# Patient Record
Sex: FEMALE | Race: WHITE | NOT HISPANIC OR LATINO | Employment: FULL TIME | URBAN - METROPOLITAN AREA
[De-identification: names, ages, dates, MRNs, and addresses within clinical notes are randomized per-mention and may not be internally consistent; named-entity substitution may affect disease eponyms.]

---

## 2023-04-10 LAB
AMORPHOUS CRYSTALS, URINE: ABNORMAL /HPF
MUCUS, URINE: ABNORMAL /LPF
RBC, URINE: ABNORMAL /HPF (ref 0–5)
SQUAMOUS EPITHELIAL CELLS, URINE: 6 /HPF
WBC, URINE: 11 /HPF (ref 0–5)

## 2023-04-11 LAB — URINE CULTURE: NORMAL

## 2023-05-01 LAB
ALANINE AMINOTRANSFERASE (SGPT) (U/L) IN SER/PLAS: 15 U/L (ref 7–45)
ALBUMIN (G/DL) IN SER/PLAS: 4.5 G/DL (ref 3.4–5)
ALKALINE PHOSPHATASE (U/L) IN SER/PLAS: 48 U/L (ref 33–110)
ANION GAP IN SER/PLAS: 13 MMOL/L (ref 10–20)
ASPARTATE AMINOTRANSFERASE (SGOT) (U/L) IN SER/PLAS: 18 U/L (ref 9–39)
BASOPHILS (10*3/UL) IN BLOOD BY AUTOMATED COUNT: 0.08 X10E9/L (ref 0–0.1)
BASOPHILS/100 LEUKOCYTES IN BLOOD BY AUTOMATED COUNT: 0.9 % (ref 0–2)
BILIRUBIN TOTAL (MG/DL) IN SER/PLAS: 0.4 MG/DL (ref 0–1.2)
C REACTIVE PROTEIN (MG/L) IN SER/PLAS: 1.19 MG/DL
CALCIDIOL (25 OH VITAMIN D3) (NG/ML) IN SER/PLAS: 70 NG/ML
CALCIUM (MG/DL) IN SER/PLAS: 9.5 MG/DL (ref 8.6–10.3)
CARBON DIOXIDE, TOTAL (MMOL/L) IN SER/PLAS: 25 MMOL/L (ref 21–32)
CHLORIDE (MMOL/L) IN SER/PLAS: 104 MMOL/L (ref 98–107)
CREATININE (MG/DL) IN SER/PLAS: 0.77 MG/DL (ref 0.5–1.05)
EOSINOPHILS (10*3/UL) IN BLOOD BY AUTOMATED COUNT: 0.14 X10E9/L (ref 0–0.7)
EOSINOPHILS/100 LEUKOCYTES IN BLOOD BY AUTOMATED COUNT: 1.6 % (ref 0–6)
ERYTHROCYTE DISTRIBUTION WIDTH (RATIO) BY AUTOMATED COUNT: 12.8 % (ref 11.5–14.5)
ERYTHROCYTE MEAN CORPUSCULAR HEMOGLOBIN CONCENTRATION (G/DL) BY AUTOMATED: 33.6 G/DL (ref 32–36)
ERYTHROCYTE MEAN CORPUSCULAR VOLUME (FL) BY AUTOMATED COUNT: 87 FL (ref 80–100)
ERYTHROCYTES (10*6/UL) IN BLOOD BY AUTOMATED COUNT: 5.06 X10E12/L (ref 4–5.2)
GFR FEMALE: >90 ML/MIN/1.73M2
GLUCOSE (MG/DL) IN SER/PLAS: 84 MG/DL (ref 74–99)
HEMATOCRIT (%) IN BLOOD BY AUTOMATED COUNT: 44 % (ref 36–46)
HEMOGLOBIN (G/DL) IN BLOOD: 14.8 G/DL (ref 12–16)
IMMATURE GRANULOCYTES/100 LEUKOCYTES IN BLOOD BY AUTOMATED COUNT: 0.3 % (ref 0–0.9)
LEUKOCYTES (10*3/UL) IN BLOOD BY AUTOMATED COUNT: 8.9 X10E9/L (ref 4.4–11.3)
LYMPHOCYTES (10*3/UL) IN BLOOD BY AUTOMATED COUNT: 3.69 X10E9/L (ref 1.2–4.8)
LYMPHOCYTES/100 LEUKOCYTES IN BLOOD BY AUTOMATED COUNT: 41.5 % (ref 13–44)
MONOCYTES (10*3/UL) IN BLOOD BY AUTOMATED COUNT: 0.59 X10E9/L (ref 0.1–1)
MONOCYTES/100 LEUKOCYTES IN BLOOD BY AUTOMATED COUNT: 6.6 % (ref 2–10)
NEUTROPHILS (10*3/UL) IN BLOOD BY AUTOMATED COUNT: 4.36 X10E9/L (ref 1.2–7.7)
NEUTROPHILS/100 LEUKOCYTES IN BLOOD BY AUTOMATED COUNT: 49.1 % (ref 40–80)
PLATELETS (10*3/UL) IN BLOOD AUTOMATED COUNT: 335 X10E9/L (ref 150–450)
POTASSIUM (MMOL/L) IN SER/PLAS: 3.6 MMOL/L (ref 3.5–5.3)
PROTEIN TOTAL: 7.3 G/DL (ref 6.4–8.2)
SEDIMENTATION RATE, ERYTHROCYTE: 8 MM/H (ref 0–20)
SODIUM (MMOL/L) IN SER/PLAS: 138 MMOL/L (ref 136–145)
UREA NITROGEN (MG/DL) IN SER/PLAS: 9 MG/DL (ref 6–23)

## 2023-05-02 LAB
IMMUNOCAP IGE: 2.6 KU/L (ref 0–214)
THYROPEROXIDASE AB (IU/ML) IN SER/PLAS: <28 IU/ML
TISSUE TRANSGLUTAMINASE IGG: <1 U/ML (ref 0–14)

## 2023-05-03 LAB
ANTI-NUCLEAR ANTIBODY (ANA): NEGATIVE
ENDOMYSIAL ANTIBODY IGA TITER: NORMAL

## 2023-05-04 LAB — THYROGLOBULIN AB (IU/ML) IN SER/PLAS: <0.9 IU/ML (ref 0–4)

## 2023-05-08 LAB — TRYPTASE: 5 MCG/L

## 2023-05-09 LAB — URTICARIA-INDUCING ACTIVITY: 3.7

## 2023-08-24 PROBLEM — N92.0 HEAVY MENSES: Status: ACTIVE | Noted: 2023-08-24

## 2023-08-24 PROBLEM — Z97.5 INTERMENSTRUAL SPOTTING DUE TO IUD: Status: ACTIVE | Noted: 2023-08-24

## 2023-08-24 PROBLEM — E28.2 PCOS (POLYCYSTIC OVARIAN SYNDROME): Status: ACTIVE | Noted: 2023-08-24

## 2023-08-24 PROBLEM — L23.9 ALLERGIC CONTACT DERMATITIS: Status: ACTIVE | Noted: 2023-08-24

## 2023-08-24 PROBLEM — F41.8 DEPRESSION WITH ANXIETY: Status: ACTIVE | Noted: 2023-08-24

## 2023-08-24 PROBLEM — N92.0 INTERMENSTRUAL SPOTTING DUE TO IUD: Status: ACTIVE | Noted: 2023-08-24

## 2023-08-24 PROBLEM — I26.99 PULMONARY EMBOLISM (MULTI): Status: ACTIVE | Noted: 2023-08-24

## 2023-08-24 PROBLEM — M25.50 ARTHRALGIA: Status: ACTIVE | Noted: 2023-08-24

## 2023-08-24 PROBLEM — G25.81 RLS (RESTLESS LEGS SYNDROME): Status: ACTIVE | Noted: 2023-08-24

## 2023-08-24 PROBLEM — R41.89 COGNITIVE CHANGES: Status: ACTIVE | Noted: 2023-08-24

## 2023-08-24 PROBLEM — M13.0 POLYARTHRITIS: Status: ACTIVE | Noted: 2023-08-24

## 2023-08-24 PROBLEM — G44.89: Status: ACTIVE | Noted: 2023-08-24

## 2023-08-24 PROBLEM — Z97.5 IUD (INTRAUTERINE DEVICE) IN PLACE: Status: ACTIVE | Noted: 2023-08-24

## 2023-08-24 PROBLEM — I82.409 DVT (DEEP VENOUS THROMBOSIS) (MULTI): Status: ACTIVE | Noted: 2023-08-24

## 2023-08-24 PROBLEM — R06.02 SHORTNESS OF BREATH AT REST: Status: ACTIVE | Noted: 2023-08-24

## 2023-08-24 PROBLEM — F31.62 BIPOLAR 1 DISORDER, MIXED, MODERATE (MULTI): Status: ACTIVE | Noted: 2023-08-24

## 2023-08-24 PROBLEM — R53.83 FATIGUE: Status: ACTIVE | Noted: 2023-08-24

## 2023-08-24 PROBLEM — F41.9 ANXIETY: Status: ACTIVE | Noted: 2023-08-24

## 2023-08-24 PROBLEM — G43.109 MIGRAINE WITH VISUAL AURA: Status: ACTIVE | Noted: 2023-08-24

## 2023-08-24 RX ORDER — PHENTERMINE HYDROCHLORIDE 37.5 MG/1
0.5 TABLET ORAL 2 TIMES DAILY
COMMUNITY
Start: 2022-08-29 | End: 2023-10-04 | Stop reason: ALTCHOICE

## 2023-08-24 RX ORDER — GABAPENTIN 300 MG/1
2 CAPSULE ORAL 3 TIMES DAILY
COMMUNITY
End: 2023-10-17 | Stop reason: WASHOUT

## 2023-08-24 RX ORDER — QUETIAPINE FUMARATE 50 MG/1
1 TABLET, FILM COATED ORAL NIGHTLY
COMMUNITY
Start: 2022-11-01 | End: 2023-11-27 | Stop reason: SDUPTHER

## 2023-08-24 RX ORDER — LURASIDONE HYDROCHLORIDE 40 MG/1
40 TABLET, FILM COATED ORAL
COMMUNITY
End: 2023-11-27 | Stop reason: DRUGHIGH

## 2023-08-24 RX ORDER — ZIPRASIDONE HYDROCHLORIDE 20 MG/1
1 CAPSULE ORAL
COMMUNITY
Start: 2022-11-03 | End: 2023-10-17 | Stop reason: WASHOUT

## 2023-08-24 RX ORDER — CODEINE PHOSPHATE AND GUAIFENESIN 10; 100 MG/5ML; MG/5ML
5 SOLUTION ORAL EVERY 4 HOURS PRN
COMMUNITY
Start: 2022-05-15 | End: 2023-10-17 | Stop reason: WASHOUT

## 2023-08-24 RX ORDER — SUMATRIPTAN 6 MG/.5ML
6 INJECTION, SOLUTION SUBCUTANEOUS
COMMUNITY
End: 2023-10-17 | Stop reason: WASHOUT

## 2023-08-24 RX ORDER — ONDANSETRON 4 MG/1
1 TABLET, FILM COATED ORAL EVERY 8 HOURS PRN
COMMUNITY
Start: 2022-05-04

## 2023-08-24 RX ORDER — CETIRIZINE HYDROCHLORIDE 10 MG/1
1 TABLET ORAL DAILY
COMMUNITY
End: 2023-12-04 | Stop reason: ALTCHOICE

## 2023-08-24 RX ORDER — QUETIAPINE FUMARATE 25 MG/1
1 TABLET, FILM COATED ORAL NIGHTLY
COMMUNITY
Start: 2022-11-01 | End: 2023-11-27 | Stop reason: DRUGHIGH

## 2023-08-24 RX ORDER — ALPRAZOLAM 0.25 MG/1
1 TABLET ORAL NIGHTLY PRN
COMMUNITY

## 2023-08-24 RX ORDER — LEVONORGESTREL 52 MG/1
INTRAUTERINE DEVICE INTRAUTERINE
COMMUNITY

## 2023-10-03 ENCOUNTER — CLINICAL SUPPORT (OUTPATIENT)
Dept: ALLERGY | Facility: CLINIC | Age: 43
End: 2023-10-03
Payer: COMMERCIAL

## 2023-10-03 ENCOUNTER — SPECIALTY PHARMACY (OUTPATIENT)
Dept: PHARMACY | Facility: CLINIC | Age: 43
End: 2023-10-03

## 2023-10-03 ENCOUNTER — E-VISIT (OUTPATIENT)
Dept: PRIMARY CARE | Facility: CLINIC | Age: 43
End: 2023-10-03
Payer: COMMERCIAL

## 2023-10-03 VITALS
HEART RATE: 81 BPM | SYSTOLIC BLOOD PRESSURE: 108 MMHG | WEIGHT: 193.8 LBS | DIASTOLIC BLOOD PRESSURE: 78 MMHG | BODY MASS INDEX: 35.66 KG/M2 | OXYGEN SATURATION: 97 % | HEIGHT: 62 IN

## 2023-10-03 DIAGNOSIS — Z13.1 SCREENING FOR DIABETES MELLITUS (DM): ICD-10-CM

## 2023-10-03 DIAGNOSIS — E66.9 OBESITY (BMI 30-39.9): ICD-10-CM

## 2023-10-03 DIAGNOSIS — R53.83 OTHER FATIGUE: ICD-10-CM

## 2023-10-03 DIAGNOSIS — Z12.31 ENCOUNTER FOR SCREENING MAMMOGRAM FOR MALIGNANT NEOPLASM OF BREAST: ICD-10-CM

## 2023-10-03 DIAGNOSIS — Z00.00 HEALTHCARE MAINTENANCE: Primary | ICD-10-CM

## 2023-10-03 DIAGNOSIS — L23.9 ALLERGIC CONTACT DERMATITIS, UNSPECIFIED TRIGGER: ICD-10-CM

## 2023-10-03 DIAGNOSIS — Z13.220 SCREENING, LIPID: ICD-10-CM

## 2023-10-03 PROCEDURE — 99396 PREV VISIT EST AGE 40-64: CPT | Performed by: STUDENT IN AN ORGANIZED HEALTH CARE EDUCATION/TRAINING PROGRAM

## 2023-10-03 PROCEDURE — 1036F TOBACCO NON-USER: CPT | Performed by: STUDENT IN AN ORGANIZED HEALTH CARE EDUCATION/TRAINING PROGRAM

## 2023-10-03 PROCEDURE — 3008F BODY MASS INDEX DOCD: CPT | Performed by: STUDENT IN AN ORGANIZED HEALTH CARE EDUCATION/TRAINING PROGRAM

## 2023-10-03 RX ORDER — BETAMETHASONE DIPROPIONATE 0.5 MG/G
1 CREAM TOPICAL DAILY
COMMUNITY
Start: 2023-04-08

## 2023-10-03 RX ORDER — BUPROPION HYDROCHLORIDE 100 MG/1
1 TABLET, EXTENDED RELEASE ORAL 2 TIMES DAILY
COMMUNITY
Start: 2023-09-26 | End: 2023-11-27 | Stop reason: SDUPTHER

## 2023-10-03 ASSESSMENT — PATIENT HEALTH QUESTIONNAIRE - PHQ9
2. FEELING DOWN, DEPRESSED OR HOPELESS: NOT AT ALL
SUM OF ALL RESPONSES TO PHQ9 QUESTIONS 1 AND 2: 0
1. LITTLE INTEREST OR PLEASURE IN DOING THINGS: NOT AT ALL

## 2023-10-03 ASSESSMENT — ENCOUNTER SYMPTOMS
FREQUENCY: 0
SHORTNESS OF BREATH: 0
EYE DISCHARGE: 0
MYALGIAS: 0
HEMATURIA: 0
WHEEZING: 0
FATIGUE: 0
APPETITE CHANGE: 0
DIARRHEA: 0
DYSURIA: 0
HALLUCINATIONS: 0
NAUSEA: 0
VOMITING: 0
HEADACHES: 0
ABDOMINAL PAIN: 0
POLYDIPSIA: 0
CONSTIPATION: 0
PALPITATIONS: 0
EYE PAIN: 0
COUGH: 0
SORE THROAT: 0
FEVER: 0

## 2023-10-03 ASSESSMENT — LIFESTYLE VARIABLES
SKIP TO QUESTIONS 9-10: 1
AUDIT-C TOTAL SCORE: 0
HOW OFTEN DO YOU HAVE A DRINK CONTAINING ALCOHOL: NEVER
HOW OFTEN DO YOU HAVE SIX OR MORE DRINKS ON ONE OCCASION: NEVER
HOW MANY STANDARD DRINKS CONTAINING ALCOHOL DO YOU HAVE ON A TYPICAL DAY: PATIENT DOES NOT DRINK

## 2023-10-03 ASSESSMENT — PAIN SCALES - GENERAL: PAINLEVEL: 0-NO PAIN

## 2023-10-03 ASSESSMENT — VISUAL ACUITY
OD_CC: 20/15
OS_CC: 20/20

## 2023-10-03 NOTE — PROGRESS NOTES
Subjective   Sivan Underwood is a 43 y.o. female and is here for a comprehensive physical exam. The patient reports no problems.    Do you take any herbs or supplements that were not prescribed by a doctor? yes, Vitamins  Are you taking calcium supplements? no  Are you taking aspirin daily? no      History:  LMP: IUD irregular every 3 months roughly  Last pap date: Last July  Abnormal pap? no  : 2  Para: 2    Do you have pain that bothers you in your daily life? no    Cancer:   -Colon (Age > 50): Denies FH  -PAP (Age > 20): Have OBGYN  -Breast (Age > 40): Due  -Lung (Age 55-80, 30 pack year, cessation within 15 years): Former smoker; Stopped     Vaccination  Tetnus: Up to date; Next   Flu: Up to date    Osteoporosis screening (Age > 59 or Fragility fracture): N/A    Tobacco: As stated above  Alcohol: Holidays  Diabetes: Denies  Lipids: Denies  Cognitive: Denies any Cognitive deficits. No cognitive deficits noted.    Review of Systems   Constitutional:  Negative for appetite change, fatigue and fever.   HENT:  Negative for congestion, ear discharge, ear pain, hearing loss and sore throat.    Eyes:  Negative for pain and discharge.   Respiratory:  Negative for cough, shortness of breath and wheezing.    Cardiovascular:  Negative for chest pain, palpitations and leg swelling.   Gastrointestinal:  Negative for abdominal pain, constipation, diarrhea, nausea and vomiting.   Endocrine: Negative for cold intolerance, heat intolerance and polydipsia.   Genitourinary:  Negative for dysuria, frequency and hematuria.   Musculoskeletal:  Negative for gait problem and myalgias.   Skin:  Negative for rash.   Neurological:  Negative for syncope and headaches.   Psychiatric/Behavioral:  Negative for hallucinations and suicidal ideas.         Objective   Physical Exam  Constitutional:       Appearance: Normal appearance.   HENT:      Head: Normocephalic and atraumatic.      Right Ear: External ear normal.      Left  Ear: External ear normal.      Nose: Nose normal.      Mouth/Throat:      Mouth: Mucous membranes are moist.   Eyes:      Extraocular Movements: Extraocular movements intact.      Conjunctiva/sclera: Conjunctivae normal.      Pupils: Pupils are equal, round, and reactive to light.   Cardiovascular:      Rate and Rhythm: Normal rate and regular rhythm.      Pulses: Normal pulses.      Heart sounds: Normal heart sounds.   Pulmonary:      Effort: Pulmonary effort is normal.      Breath sounds: Normal breath sounds.   Abdominal:      General: Abdomen is flat.      Palpations: Abdomen is soft.   Neurological:      General: No focal deficit present.      Mental Status: She is alert and oriented to person, place, and time.   Psychiatric:         Mood and Affect: Mood normal.         Behavior: Behavior normal.       Assessment/Plan       1. Preventive screenings as stated above  2. Patient Counseling:  --Nutrition: Stressed importance of moderation in sodium/caffeine intake, saturated fat and cholesterol, caloric balance, sufficient intake of fresh fruits, vegetables, fiber, calcium, iron, and 1 mg of folate supplement per day (for females capable of pregnancy).  --Discussed the issue of estrogen replacement, calcium supplement, and the daily use of baby aspirin.  --Exercise: Stressed the importance of regular exercise.   --Substance Abuse: Discussed cessation/primary prevention of tobacco, alcohol, or other drug use; driving or other dangerous activities under the influence; availability of treatment for abuse.    --Sexuality: Discussed sexually transmitted diseases, partner selection, use of condoms, avoidance of unintended pregnancy  and contraceptive alternatives.   --Injury prevention: Discussed safety belts, safety helmets, smoke detector, smoking near bedding or upholstery.   --Dental health: Discussed importance of regular tooth brushing, flossing, and dental visits.  --Immunizations reviewed.  --Discussed benefits  of screening colonoscopy.  --After hours service discussed with patient  3. Discussed the patient's BMI with her.  The BMI is above average. The patient received Provided instructions on dietary changes because they have an above normal BMI.  4. Follow up in one year

## 2023-10-04 RX ORDER — PHENTERMINE HYDROCHLORIDE 37.5 MG/1
TABLET ORAL
Qty: 30 TABLET | Refills: 0 | Status: SHIPPED | OUTPATIENT
Start: 2023-10-04 | End: 2023-11-07 | Stop reason: SDUPTHER

## 2023-10-17 ENCOUNTER — OFFICE VISIT (OUTPATIENT)
Dept: SURGERY | Facility: CLINIC | Age: 43
End: 2023-10-17
Payer: COMMERCIAL

## 2023-10-17 VITALS
DIASTOLIC BLOOD PRESSURE: 89 MMHG | BODY MASS INDEX: 34.69 KG/M2 | HEART RATE: 84 BPM | WEIGHT: 188.5 LBS | TEMPERATURE: 98.2 F | HEIGHT: 62 IN | SYSTOLIC BLOOD PRESSURE: 132 MMHG

## 2023-10-17 DIAGNOSIS — E66.09 CLASS 2 OBESITY DUE TO EXCESS CALORIES WITH BODY MASS INDEX (BMI) OF 35.0 TO 35.9 IN ADULT, UNSPECIFIED WHETHER SERIOUS COMORBIDITY PRESENT: Primary | ICD-10-CM

## 2023-10-17 DIAGNOSIS — R63.2 POLYPHAGIA: ICD-10-CM

## 2023-10-17 PROCEDURE — 99213 OFFICE O/P EST LOW 20 MIN: CPT

## 2023-10-17 PROCEDURE — 3008F BODY MASS INDEX DOCD: CPT

## 2023-10-17 PROCEDURE — 1036F TOBACCO NON-USER: CPT

## 2023-10-17 NOTE — PROGRESS NOTES
Subjective     Patient ID: Sivan Underwood is a 43 y.o. female who presents for FUV on CWL.    HPI    This is 42 YO Male who presents here for a weight management consultation.   Referred by Sarai Cunningham MD        # Weight History:  Initial onset of obesity: around 2018  Past Diet Attempts: strict vegan  Diet with best results/success: meal planning and calories counting  Have/Have not taken Prescription/OTC medications for weight loss including: Adipex  Goal(s) for weight loss: 130-140 lbs  Juice, pop or other high calorie beverages? 1 can of pop every other day   Restaurant/Fast Food Frequency: 2-3 times a week     # Anthropometric Measurements:       Highest Adult Weight: 210 lbs, about 1 year ago      Lowest Adult Weight: 105 lbs in 2018  Previous Weight: 198 lbs  Current Weight: 188 lbs  Initial BMI 36  Total Weight Loss: 10 lbs     # Current Exercise Routine: none         PMH:   Active Problems: Anxiety and Depression, Bipolar 1, DVT, PCOS       Past Medical History:   Diagnosis Date    Acute bronchitis, unspecified 01/20/2022    Bronchitis, subacute    Chronic cough 01/20/2022    Post-COVID chronic cough    Injury of conjunctiva and corneal abrasion without foreign body, right eye, initial encounter 03/30/2017    Abrasion of cornea, right    Obesity, unspecified 02/19/2022    Class 2 obesity with body mass index (BMI) of 37.0 to 37.9 in adult    Obesity, unspecified 03/06/2022    Class 2 obesity with body mass index (BMI) of 36.0 to 36.9 in adult    Obesity, unspecified     Class 1 obesity with body mass index (BMI) of 31.0 to 31.9 in adult    Other chest pain 05/12/2022    Chest heaviness    Otitis media, unspecified, left ear 06/16/2018    Left otitis media    Pain in right lower leg 05/12/2022    Right calf pain    Personal history of other diseases of the respiratory system 06/16/2018    History of pharyngitis    Personal history of other diseases of the respiratory system 06/16/2018    History of sore  "throat      Past Surgical History:   Procedure Laterality Date     SECTION, CLASSIC  2017     Section    CT HEAD ANGIO W AND WO IV CONTRAST  2022    CT HEAD ANGIO W AND WO IV CONTRAST 2022 [facility]    CT NECK ANGIO W AND WO IV CONTRAST  2022    CT NECK ANGIO W AND WO IV CONTRAST 2022 [facility]    HERNIA REPAIR  2017    Hernia Repair    OTHER SURGICAL HISTORY  2022    Breast augmentation      Family History   Problem Relation Name Age of Onset    Diabetes Mother      Rheum arthritis Mother      Hypertension Mother      Bipolar disorder Father      Diabetes Other FAMILY HISTORY       Social History     Tobacco Use   Smoking Status Former    Types: Cigarettes    Quit date:     Years since quittin.8   Smokeless Tobacco Never      Social History     Substance and Sexual Activity   Drug Use Never      Social History     Substance and Sexual Activity   Alcohol Use Never        Allergies  Allergies   Allergen Reactions    Doxycycline Unknown     HIVES      Formaldehyde Unknown    Lamictal [Lamotrigine] Unknown    Meperidine Unknown    Morphine Unknown    Tioconazole Unknown        VITALS  Visit Vitals  /89   Pulse 84   Temp 36.8 °C (98.2 °F)   Ht 1.562 m (5' 1.5\")   Wt 85.5 kg (188 lb 8 oz)   BMI 35.04 kg/m²   Smoking Status Former   BSA 1.93 m²        LABS  Lab Results   Component Value Date/Time    [id number] 218 2022 1100    TSH 1.48 2022 1103    VITD25 70 2023 1357       ROS  Review of Systems  GENERAL: Denies fever/chills       HEENT: Denies headache, new or worsening vision and hearing problems, nasal congestion, hoarseness, sore throat             CV: Denies chest pain, palpitations         RESP: Denies SOB, cough, wheezing              GI: Denies n/v, abdominal pain, diarrhea, constipation            : Denies urinary urgency/frequency     NEURO: Denies headache, weakness, numbness, tingling       ENDO: " Denies excessive heat/cold, recent weight gain/loss        MUSC:Denies low back pain, joint pain      PSYCH: Denies substance use disorder, anxiety, depression       PHYSICAL EXAM  Physical Exam  General- No acute distress, well appearing and well nourished. Obese  HEENT - no erythema, swelling or discharge. Neck supple, no cervical lymphadenopathy.   Pulmonary - respiratory effort normal, lungs CTAB.   Cardiovascular - HRR, no m/r/g  Extremities - no edema and/or varicosities, no peripheral edema  Abdomen - Soft, Non-tender, non-distended, no abdominal masses.   Musculoskeletal - Range of motion WNL  Skin - normal without rashes or lesions.  Neurologic - reflexes intact, coordination WNL, gait WNL  Psychiatric - AXO x3, mood and affect appropriate      ASSESSMENT/PLAN  Patient here for medical weight loss.     Following high protein low carbs meals, stays within 1500 verito/day meal.   Sivan shared that her PCP started Phentermine therapy with her on 10/04/23. OARRS reviewed.   Also, Sivan's PCP referred Sivan to a private clinic where she is being treated with compound drug Semaglutide + B12. She is currently taking Semaglutide 0.25 mg subcutaneous weekly.   We discussed during our visit that her pharmacy benefits won't cover Semaglutide/any other GLP1 RA.   She has already been established with Wellbutrin therapy.   She does not like the way she feels when she takes Topiramate (from previous experience).   Sivan would like to stay under her PCP care for weight loss management to continue with Phentermine and Semiglutide.     Assessment/Plan   Problem List Items Addressed This Visit    None  Visit Diagnoses       Class 2 obesity due to excess calories with body mass index (BMI) of 35.0 to 35.9 in adult, unspecified whether serious comorbidity present    -  Primary    Polyphagia               FUV PRN

## 2023-10-18 NOTE — PATIENT INSTRUCTIONS
*Please schedule with outpatient dietitian to optimize the dietary recommendations below to your individual food preferences and dietary patterns by calling [phone]*    *If lab work ordered for you today - complete prior to next visit - labs are fasting*    A few key concepts for weight management:  1. Calorie control is necessary for weight loss  2. Protein prioritization helps control appetite and helps to maintain lean/muscle body mass with weight loss  3. Fiber rich foods ensure balanced blood sugar and help you stay full, also high nutrient foods  4. Consistency is critical for long term success  5. Successful weight loss requires trade offs - we acknowledge that meal planning and some element of preparation for most days of the week is necessary for success  6. Set an environment for success - keep binge-worthy processed food products out of the home as much as possible  7. Tracking some component of your nutrition intake (whether it's calories, macros, carbs, meal structure, etc) is a tool to help you stay consistent and accountable  8. Regular physical activity is critical for weight loss maintenance. Exercise helps us maintain lean body mass (muscle mass) which helps to maintain a higher metabolic rate (how many calories your burn daily). Without maintaining lean mass/muscle mass, weight loss is more difficult to sustain long term.     Detailed Diet Recommendations:  Self monitoring through some metric can be a tool to help with weight loss. Measuring portions can be one way to monitor your nutrition intake to support health improvement.   - Some apps you can use are [employer], Lose It, Carb Manager, Cronometer, etc. Self monitoring is an extremely useful tool however please recognize that external tracking devices for activity and calories are not perfect, underestimating of total calorie intake is common, as well as overestimation through activity trackers of total calorie expenditure.  "    Please limit processed foods in the diet as they are known to contribute to obesity and weight gain. Processed foods include food items like bakery, chips, snack crackers, cereals, cookies, some pasta/breads, etc. These foods will typically come from \"bag, boxes with barcodes\" from the middle aisles of the grocery store - they contain added sugars and refined flours, are low in fiber and may stimulate appetite rather than help manage it effectively.     Increasing your intake of HIGH FIBER carbohydrates sources to increase fullness and appetite regulation with meals and between meals. High fiber foods are vegetables, fruit, beans, lentils, nuts/seeds, etc.      WHAT WILL MY PLATE LOOK LIKE IF I EAT LIKE THIS?.    - Aim for MINIMUM 25-30g of protein at meals: approx 4 oz of high protein food like chicken, fish, turkey, beef, pork, 2-3 eggs, 1/2-3/4 cup cottage cheese or plain greek yogurt. At each meal EAT PROTEIN FIRST! This sets the stage to help better regulate your appetite during and between meals. Think \"Palm of your hand portion of protein\" at each meal.     - The remainder of the plate is optimized to increase quality of the diet by including plenty of vegetables, appropriate fruit or starch portions: Aim for \"2 fist sized portions of fruits and vegetables with each meal\". A \"fist sized\" portion is about 1 cup.   - Choose a variety of vegetables, fruits and whole grains - aim to eat a wide variety of colors to improve quality of diet.     Fat included with meals is best in small portions - Fat is calorie dense so it is important to monitor the portion, to avoid excess calories. Keep servings to the followin-2 tbsp olive/avocado/coconut oil (can be used to cook or dress vegetables), 1-2 tbsp unsweetened nut butter (peanut, almond, etc), 5-6 olives, 1/3 avocado, 1/2-1 oz nuts/seeds likes walnuts, almonds, pecans, brazil nuts, etc. This would include if you are cooking your meal with oil/butter/etc. "     Please avoid liquid sources of calories as we often do not think about their contribution to our total daily calorie intake nor do they help regulate appetite when working towards weight loss. Hydration is important, aim for at least 64 oz zero calorie beverages daily, ideally water.     Exercise Recommendations:   Continue regular exercise regimen - you are following an appropriate program of 4x/week of resistance training & aerobic training for 60-90 minutes per session, achieving > than 200 minute per week goal.    Web based resources for meal planning:  www.[employer] - This is a website authored by registered dietitians, has many great resources for healthy nutrition.     Follow up: PRN    If appointment was not scheduled before leaving today please call [phone] to speak with an .    Follow up visits are typically VIRTUAL  - Please have a reliable scale to weigh yourself at home for follow up visits  - Please have a blood pressure cuff to monitor blood pressure & heart rate at home (can be purchased over the counter, on [employer], drug store, etc).   - For virtual visits you must be in the State of [place]  - YOU CANNOT BE DRIVING, please remember this is an appointment and should be treated the same as if you were in the office for follow up appointment.

## 2023-10-19 ENCOUNTER — TELEPHONE (OUTPATIENT)
Dept: PRIMARY CARE | Facility: CLINIC | Age: 43
End: 2023-10-19
Payer: COMMERCIAL

## 2023-10-22 PROBLEM — M54.42 ACUTE BILATERAL LOW BACK PAIN WITH BILATERAL SCIATICA: Status: ACTIVE | Noted: 2021-01-04

## 2023-10-22 PROBLEM — M54.41 ACUTE BILATERAL LOW BACK PAIN WITH BILATERAL SCIATICA: Status: ACTIVE | Noted: 2021-01-04

## 2023-10-22 PROBLEM — E66.9 OBESITY, CLASS II, BMI 35-39.9: Status: ACTIVE | Noted: 2020-11-12

## 2023-10-22 PROBLEM — F41.0 PANIC DISORDER: Status: ACTIVE | Noted: 2018-10-16

## 2023-10-22 PROBLEM — E66.9 OBESITY, CLASS I, BMI 30-34.9: Status: ACTIVE | Noted: 2018-09-21

## 2023-10-22 PROBLEM — K21.9 GASTROESOPHAGEAL REFLUX DISEASE WITHOUT ESOPHAGITIS: Status: ACTIVE | Noted: 2020-11-12

## 2023-10-22 PROBLEM — F32.1 CURRENT MODERATE EPISODE OF MAJOR DEPRESSIVE DISORDER WITHOUT PRIOR EPISODE (MULTI): Status: ACTIVE | Noted: 2020-11-12

## 2023-10-22 PROBLEM — M75.41 IMPINGEMENT SYNDROME OF RIGHT SHOULDER: Status: ACTIVE | Noted: 2018-03-05

## 2023-10-22 PROBLEM — O09.90 HIGH-RISK PREGNANCY (HHS-HCC): Status: ACTIVE | Noted: 2023-10-22

## 2023-10-22 PROBLEM — G43.109 OPHTHALMIC MIGRAINE: Status: ACTIVE | Noted: 2018-09-21

## 2023-10-22 PROBLEM — G47.19 EXCESSIVE DAYTIME SLEEPINESS: Status: ACTIVE | Noted: 2019-12-17

## 2023-10-22 PROBLEM — G47.33 OSA (OBSTRUCTIVE SLEEP APNEA): Status: ACTIVE | Noted: 2019-11-07

## 2023-10-22 PROBLEM — E66.812 OBESITY, CLASS II, BMI 35-39.9: Status: ACTIVE | Noted: 2020-11-12

## 2023-10-22 PROBLEM — E66.811 OBESITY, CLASS I, BMI 30-34.9: Status: ACTIVE | Noted: 2018-09-21

## 2023-10-22 RX ORDER — PRAMIPEXOLE DIHYDROCHLORIDE 0.25 MG/1
1 TABLET ORAL NIGHTLY
COMMUNITY
Start: 2020-11-12 | End: 2023-12-04 | Stop reason: ALTCHOICE

## 2023-10-22 RX ORDER — OMEPRAZOLE 40 MG/1
1 CAPSULE, DELAYED RELEASE ORAL
COMMUNITY
Start: 2020-11-12

## 2023-10-22 RX ORDER — METHOCARBAMOL 500 MG/1
1 TABLET, FILM COATED ORAL 2 TIMES DAILY PRN
COMMUNITY
Start: 2021-03-17

## 2023-10-22 RX ORDER — DIVALPROEX SODIUM 500 MG/1
500 TABLET, DELAYED RELEASE ORAL 2 TIMES DAILY
COMMUNITY
Start: 2021-06-11 | End: 2023-11-27 | Stop reason: WASHOUT

## 2023-10-24 ENCOUNTER — CLINICAL SUPPORT (OUTPATIENT)
Dept: ALLERGY | Facility: CLINIC | Age: 43
End: 2023-10-24
Payer: COMMERCIAL

## 2023-10-24 DIAGNOSIS — L27.0 DERMATITIS MEDICAMENTOSA DUE TO INGESTED DRUGS: Primary | ICD-10-CM

## 2023-10-24 PROCEDURE — 96372 THER/PROPH/DIAG INJ SC/IM: CPT | Performed by: ALLERGY & IMMUNOLOGY

## 2023-10-26 ENCOUNTER — TELEMEDICINE (OUTPATIENT)
Dept: BEHAVIORAL HEALTH | Facility: CLINIC | Age: 43
End: 2023-10-26
Payer: COMMERCIAL

## 2023-10-26 DIAGNOSIS — F31.62 BIPOLAR 1 DISORDER, MIXED, MODERATE (MULTI): ICD-10-CM

## 2023-10-26 DIAGNOSIS — F43.22 ADJUSTMENT DISORDER WITH ANXIOUS MOOD: ICD-10-CM

## 2023-10-26 DIAGNOSIS — R41.840 ATTENTION DISTURBANCE: ICD-10-CM

## 2023-10-26 PROCEDURE — 99213 OFFICE O/P EST LOW 20 MIN: CPT | Performed by: CLINICAL NURSE SPECIALIST

## 2023-10-26 NOTE — PROGRESS NOTES
Outpatient Psychiatry      Subjective   Sivan Underwood, is a 43 y.o. female,  seen in follow-up.      Assessment/Plan   Encounter Diagnoses   Name Primary?    Bipolar 1 disorder, mixed, moderate (CMS/HCC)     Adjustment disorder with anxious mood     Attention disturbance      Plan:  Continue bupropion  mg PO daily.   Continue lurasidone 40 mg PO QPM (with food).   Continue quetiapine 75 mg PO at bedtime  Continue alprazolam 0.25 mg PO PRN for severe anxiety (managed by PCP)  Looking for individual therapist covered by insurance, and plans to start soon.       Follow-up in 4-6 weeks.     Risk Assessment:  Risk Assessment: Tete Underwood is currently a low acute risk of suicide and self-harm due to no past suicide attempt(s) and not currently endorsing thoughts of suicide. Tete Underwood is currently a low acute risk of violence and harm to others due to no past history of violence and not currently threatening others.  Suicidal Risk Factors: , history of trauma/abuse, chronic medical illness, and current psychiatric illness  Violence Risk Factors: none  Protective Factors: strong coping skills, sense of responsibility towards family, social support/connectedness, moral objections to suicide, child related concerns/living with child <18 years, hopefulness/future orientation, and employment     Provided with crisis/emergency resources, including Mobile Crisis [phone], Crisis Text Line (text 1ZHSO vr 973699) and the National Suicide Prevention Lifeline hotline 1-914.713.4934. Agrees to call 988 or go to the nearest emergency department if she feels unsafe, or has suicidal thinking with a plan or intent.     Reason for Visit:  Follow-up for medication management.     HPI:  Since her last visit,     A lot going on  Daughter going through a lot  Working on getting her connected with services- has good support    Feeling better since starting bupropion  Helping with inattentive sx- small  "improvements in focus and overall day to day at work.   Mood has been more positive, not so down in the dumps and depressed.  Nausea has improved with lurasidone (~2-3 weeks ago), no longer taking OTC medication for that    Sleeping well- started taking Magnesium and ashwagandha at night  Feels rested when she wakes up, on weekends sleeps in a little more  Energy level is a little better, less fatigued    PCP started phentermine + semaglutide for weight loss  Working really hard on herself and eating healthy  Lost ~14 lbs     Still receiving dupixant injections Q2 weeks- notices sx start to flare if she waits longer than that.     Denies suicidal ideation or a passive death wish.       Current Psychiatric Medications:  Lurasidone 40 mg PO QPM  Bupropion  mg PO BID  Quetiapine 75 mg PO QHS      Record Review: moderate     Medical Review Of Systems:  Pertinent items are noted in HPI.    Psychiatric Review Of Systems:  As noted in HPI.        Objective   Mental Status Exam   General: Appropriately groomed and dressed   Appearance: Appears stated age   Attitude: Calm, cooperative   Behavior: Appropriate eye contact.   Motor Activity: no PMA/PMR, no tremor/EPS/TD.   Speech: Regular rate, rhythm, volume and tone, spontaneous, fluent.   Mood: \"Ok.\"  Affect: full, anxious, congruent.   Thought Process: Organized, linear, goal directed. Associations are logical.   Thought Content: Does not endorse suicidal or homicidal ideation, no delusions elicited. ongoing concern re: difficulty with focus/inattention, and impact on work performance; some anxious themes r/t recent relationship strain.   Thought Perception: Does not endorse auditory or visual hallucinations, does not appear to be responding to hallucinatory stimuli.   Cognition: Alert, oriented x3. No deficits noted. Adequate fund of knowledge. No deficit in recent and remote memory. No deficits in attention, concentration or language.  subjectively reports " difficulty with inattention.   Insight: Good, as patient recognizes symptoms of illness and need for recommended treatments.   Judgment: Can make reasonable decisions about ordinary activities of daily living and necessary medical care recommendations.       Vitals:  There were no vitals filed for this visit.    Labs:  not applicable      Rachel Small, APRN-CNP, APRN-CNS

## 2023-10-30 ENCOUNTER — APPOINTMENT (OUTPATIENT)
Dept: PRIMARY CARE | Facility: CLINIC | Age: 43
End: 2023-10-30
Payer: COMMERCIAL

## 2023-10-31 ENCOUNTER — APPOINTMENT (OUTPATIENT)
Dept: BEHAVIORAL HEALTH | Facility: CLINIC | Age: 43
End: 2023-10-31
Payer: COMMERCIAL

## 2023-10-31 DIAGNOSIS — L27.0 DERMATITIS MEDICAMENTOSA DUE TO INGESTED DRUGS: Primary | ICD-10-CM

## 2023-11-03 ENCOUNTER — OFFICE VISIT (OUTPATIENT)
Dept: PRIMARY CARE | Facility: CLINIC | Age: 43
End: 2023-11-03
Payer: COMMERCIAL

## 2023-11-07 ENCOUNTER — APPOINTMENT (OUTPATIENT)
Dept: ALLERGY | Facility: CLINIC | Age: 43
End: 2023-11-07
Payer: COMMERCIAL

## 2023-11-07 ENCOUNTER — TELEMEDICINE (OUTPATIENT)
Dept: PRIMARY CARE | Facility: CLINIC | Age: 43
End: 2023-11-07
Payer: COMMERCIAL

## 2023-11-07 DIAGNOSIS — E66.9 OBESITY, CLASS II, BMI 35-39.9: Primary | ICD-10-CM

## 2023-11-07 DIAGNOSIS — E66.9 OBESITY (BMI 30-39.9): ICD-10-CM

## 2023-11-07 PROCEDURE — 99442 PR PHYS/QHP TELEPHONE EVALUATION 11-20 MIN: CPT | Performed by: STUDENT IN AN ORGANIZED HEALTH CARE EDUCATION/TRAINING PROGRAM

## 2023-11-07 RX ORDER — PHENTERMINE HYDROCHLORIDE 37.5 MG/1
TABLET ORAL
Qty: 30 TABLET | Refills: 0 | Status: SHIPPED | OUTPATIENT
Start: 2023-11-07 | End: 2023-12-04 | Stop reason: SDUPTHER

## 2023-11-07 ASSESSMENT — ENCOUNTER SYMPTOMS
FREQUENCY: 0
SORE THROAT: 0
APPETITE CHANGE: 0
VOMITING: 0
HEMATURIA: 0
MYALGIAS: 0
NAUSEA: 0
EYE DISCHARGE: 0
CONSTIPATION: 0
ABDOMINAL PAIN: 0
HEADACHES: 0
COUGH: 0
FEVER: 0
WHEEZING: 0
FATIGUE: 0
SHORTNESS OF BREATH: 0
PALPITATIONS: 0
DIARRHEA: 0
HALLUCINATIONS: 0
POLYDIPSIA: 0
DYSURIA: 0
EYE PAIN: 0

## 2023-11-07 NOTE — PROGRESS NOTES
Subjective   Patient ID: Sivan Underwood is a 43 y.o. female who presents for Follow-up and Med Refill.    Med Refill  Pertinent negatives include no abdominal pain, chest pain, congestion, coughing, fatigue, fever, headaches, myalgias, nausea, rash, sore throat or vomiting.      Patient is here for follow-up of obesity.  Has been tolerating her phentermine and semaglutide very well.  Needs refills on medications today.  Down 7 pounds since last seen 1 month ago.  Denies any chest pain, shortness breath, headaches.  Very pleased with the progress.  Denies any adverse reaction with her medication regimen at this point time.  Wants to continue the current medication regimen.  Aiming for lower caloric intake.  Small portion sizes.    Review of Systems   Constitutional:  Negative for appetite change, fatigue and fever.   HENT:  Negative for congestion, ear discharge, ear pain, hearing loss and sore throat.    Eyes:  Negative for pain and discharge.   Respiratory:  Negative for cough, shortness of breath and wheezing.    Cardiovascular:  Negative for chest pain, palpitations and leg swelling.   Gastrointestinal:  Negative for abdominal pain, constipation, diarrhea, nausea and vomiting.   Endocrine: Negative for cold intolerance, heat intolerance and polydipsia.   Genitourinary:  Negative for dysuria, frequency and hematuria.   Musculoskeletal:  Negative for gait problem and myalgias.   Skin:  Negative for rash.   Neurological:  Negative for syncope and headaches.   Psychiatric/Behavioral:  Negative for hallucinations and suicidal ideas.        Objective   There were no vitals taken for this visit.    Physical Exam  Neurological:      Mental Status: She is alert.   Psychiatric:         Mood and Affect: Mood normal.         Assessment/Plan   Continue on semaglutide as well as phentermine.  We will follow-up in 1 month for reevaluation of obesity

## 2023-11-27 ENCOUNTER — TELEMEDICINE (OUTPATIENT)
Dept: BEHAVIORAL HEALTH | Facility: CLINIC | Age: 43
End: 2023-11-27
Payer: COMMERCIAL

## 2023-11-27 DIAGNOSIS — R41.840 ATTENTION DISTURBANCE: ICD-10-CM

## 2023-11-27 DIAGNOSIS — F31.62 BIPOLAR 1 DISORDER, MIXED, MODERATE (MULTI): ICD-10-CM

## 2023-11-27 DIAGNOSIS — F43.22 ADJUSTMENT DISORDER WITH ANXIOUS MOOD: ICD-10-CM

## 2023-11-27 PROCEDURE — 99214 OFFICE O/P EST MOD 30 MIN: CPT | Performed by: CLINICAL NURSE SPECIALIST

## 2023-11-27 RX ORDER — QUETIAPINE FUMARATE 50 MG/1
50 TABLET, FILM COATED ORAL NIGHTLY
Qty: 30 TABLET | Refills: 1 | Status: SHIPPED | OUTPATIENT
Start: 2023-11-27 | End: 2024-01-10 | Stop reason: SDUPTHER

## 2023-11-27 RX ORDER — BUPROPION HYDROCHLORIDE 100 MG/1
100 TABLET, EXTENDED RELEASE ORAL 2 TIMES DAILY
Qty: 60 TABLET | Refills: 2 | Status: SHIPPED | OUTPATIENT
Start: 2023-11-27 | End: 2024-02-21 | Stop reason: SDUPTHER

## 2023-11-27 RX ORDER — LURASIDONE HYDROCHLORIDE 60 MG/1
60 TABLET, FILM COATED ORAL
Qty: 30 TABLET | Refills: 1 | Status: SHIPPED | OUTPATIENT
Start: 2023-11-27 | End: 2024-01-10 | Stop reason: SDUPTHER
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
## 2023-11-27 NOTE — ASSESSMENT & PLAN NOTE
>>ASSESSMENT AND PLAN FOR ATTENTION DISTURBANCE WRITTEN ON 11/27/2023 12:22 PM BY RAUDEL WASHINGTON, APRN-CNP, APRN-CNS    Completed BAARS-IV assessment in August 2023 and at that time results taken together with clinical interview did not support ADHD dx. Possible that poorly controlled mood sx may have confounded results. She has recently been taking phentermine under care of PCP for weight loss, and has noted significant improvement in inattentive sx she has historically endorsed. Will reassess as mood sx stabilize, and after she's completed course of phentermine (as it would not be appropriate to consider treatment with a stimulant + phentermine).

## 2023-11-27 NOTE — ASSESSMENT & PLAN NOTE
Completed BAARS-IV assessment in August 2023 and at that time results taken together with clinical interview did not support ADHD dx. Possible that poorly controlled mood sx may have confounded results. She has recently been taking phentermine under care of PCP for weight loss, and has noted significant improvement in inattentive sx she has historically endorsed. Will reassess as mood sx stabilize, and after she's completed course of phentermine (as it would not be appropriate to consider treatment with a stimulant + phentermine).

## 2023-11-27 NOTE — ASSESSMENT & PLAN NOTE
Some mood dysregulation initially when quetiapine dose was reduced from 75--> 50 mg PO at bedtime that has since improved, however, continues to experience significant mood sx several weeks per month that she associates to some extent with menstrual cycle, and that are difficult to manage and disruptive. Discussed option to further titration lurasidone and she is receptive. Reviewed r/b/a.   Increase lurasidone to 60 mg PO daily with food.   Continue quetiapine 50 mg PO at bedtime- goal is to gradually taper and discontinue d/t concern for metabolic side effects. -refill sent to pharmacy today.   Continue bupropion  mg PO BID- refill sent to pharmacy today.   Plans to get established with individual therapist- can assist with referrals if needed.

## 2023-11-28 ENCOUNTER — CLINICAL SUPPORT (OUTPATIENT)
Dept: ALLERGY | Facility: CLINIC | Age: 43
End: 2023-11-28
Payer: COMMERCIAL

## 2023-11-28 DIAGNOSIS — L27.0 DERMATITIS MEDICAMENTOSA DUE TO INGESTED DRUGS: ICD-10-CM

## 2023-11-28 DIAGNOSIS — L23.9 ALLERGIC CONTACT DERMATITIS, UNSPECIFIED TRIGGER: ICD-10-CM

## 2023-11-28 PROCEDURE — 96372 THER/PROPH/DIAG INJ SC/IM: CPT | Performed by: ALLERGY & IMMUNOLOGY

## 2023-12-04 ENCOUNTER — TELEMEDICINE (OUTPATIENT)
Dept: PRIMARY CARE | Facility: CLINIC | Age: 43
End: 2023-12-04
Payer: COMMERCIAL

## 2023-12-04 DIAGNOSIS — E66.9 OBESITY (BMI 30-39.9): ICD-10-CM

## 2023-12-04 PROCEDURE — 99442 PR PHYS/QHP TELEPHONE EVALUATION 11-20 MIN: CPT | Performed by: STUDENT IN AN ORGANIZED HEALTH CARE EDUCATION/TRAINING PROGRAM

## 2023-12-04 RX ORDER — PHENTERMINE HYDROCHLORIDE 37.5 MG/1
TABLET ORAL
Qty: 30 TABLET | Refills: 0 | Status: SHIPPED | OUTPATIENT
Start: 2023-12-04 | End: 2023-12-08 | Stop reason: SDUPTHER

## 2023-12-04 ASSESSMENT — ENCOUNTER SYMPTOMS
SHORTNESS OF BREATH: 0
NAUSEA: 0
HALLUCINATIONS: 0
ABDOMINAL PAIN: 0
HEADACHES: 0
HEMATURIA: 0
VOMITING: 0
EYE PAIN: 0
FEVER: 0
EYE DISCHARGE: 0
PALPITATIONS: 0
COUGH: 0
POLYDIPSIA: 0
APPETITE CHANGE: 0
FREQUENCY: 0
SORE THROAT: 0
WHEEZING: 0
DYSURIA: 0
CONSTIPATION: 0
MYALGIAS: 0
FATIGUE: 0
DIARRHEA: 0

## 2023-12-04 NOTE — PROGRESS NOTES
Subjective   Patient ID: Sivan Underwood is a 43 y.o. female who presents for follow up weight loss.    HPI   Here for follow-up of obesity.  Current weight is 176.  Has been tolerating medication very well.  Did get some nausea on the higher dose of semaglutide however currently on 8.5 mg dose of semaglutide.  Taking 10 units of the 5 mg/mL dose.  Denies any chest pain, shortness of breath, headaches.  Needs an refills on phentermine.  Has been working with a dietitian.  Formulating a reasonable diet for further weight loss.  Very pleased with the progress.  Denies any adverse reaction with medication regimen.    Review of Systems   Constitutional:  Negative for appetite change, fatigue and fever.   HENT:  Negative for congestion, ear discharge, ear pain, hearing loss and sore throat.    Eyes:  Negative for pain and discharge.   Respiratory:  Negative for cough, shortness of breath and wheezing.    Cardiovascular:  Negative for chest pain, palpitations and leg swelling.   Gastrointestinal:  Negative for abdominal pain, constipation, diarrhea, nausea and vomiting.   Endocrine: Negative for cold intolerance, heat intolerance and polydipsia.   Genitourinary:  Negative for dysuria, frequency and hematuria.   Musculoskeletal:  Negative for gait problem and myalgias.   Skin:  Negative for rash.   Neurological:  Negative for syncope and headaches.   Psychiatric/Behavioral:  Negative for hallucinations and suicidal ideas.        Objective   There were no vitals taken for this visit.    Physical Exam  Neurological:      Mental Status: She is alert.   Psychiatric:         Mood and Affect: Mood normal.         Assessment/Plan   Continue on 0.5 mg of semaglutide as well as phentermine.  1 month follow-up.  Needs a physical appointment to do a weight check.

## 2023-12-08 ENCOUNTER — TELEPHONE (OUTPATIENT)
Dept: PRIMARY CARE | Facility: CLINIC | Age: 43
End: 2023-12-08
Payer: COMMERCIAL

## 2023-12-08 DIAGNOSIS — E66.9 OBESITY (BMI 30-39.9): ICD-10-CM

## 2023-12-08 RX ORDER — PHENTERMINE HYDROCHLORIDE 37.5 MG/1
TABLET ORAL
Qty: 30 TABLET | Refills: 0 | Status: SHIPPED | OUTPATIENT
Start: 2023-12-08 | End: 2024-01-03 | Stop reason: SDUPTHER

## 2023-12-19 ENCOUNTER — CLINICAL SUPPORT (OUTPATIENT)
Dept: ALLERGY | Facility: CLINIC | Age: 43
End: 2023-12-19
Payer: COMMERCIAL

## 2023-12-19 DIAGNOSIS — L23.9 ALLERGIC CONTACT DERMATITIS, UNSPECIFIED TRIGGER: ICD-10-CM

## 2023-12-19 DIAGNOSIS — L27.0 DERMATITIS MEDICAMENTOSA DUE TO INGESTED DRUGS: ICD-10-CM

## 2023-12-19 DIAGNOSIS — L50.9 URTICARIA: Primary | ICD-10-CM

## 2023-12-19 PROCEDURE — 96372 THER/PROPH/DIAG INJ SC/IM: CPT | Performed by: ALLERGY & IMMUNOLOGY

## 2024-01-03 ENCOUNTER — OFFICE VISIT (OUTPATIENT)
Dept: PRIMARY CARE | Facility: CLINIC | Age: 44
End: 2024-01-03
Payer: COMMERCIAL

## 2024-01-03 VITALS
WEIGHT: 177 LBS | DIASTOLIC BLOOD PRESSURE: 81 MMHG | HEIGHT: 62 IN | HEART RATE: 82 BPM | SYSTOLIC BLOOD PRESSURE: 115 MMHG | BODY MASS INDEX: 32.57 KG/M2 | TEMPERATURE: 97.8 F

## 2024-01-03 DIAGNOSIS — E66.9 OBESITY (BMI 30-39.9): ICD-10-CM

## 2024-01-03 DIAGNOSIS — E66.09 CLASS 1 OBESITY DUE TO EXCESS CALORIES WITHOUT SERIOUS COMORBIDITY WITH BODY MASS INDEX (BMI) OF 32.0 TO 32.9 IN ADULT: ICD-10-CM

## 2024-01-03 DIAGNOSIS — Z71.3 ENCOUNTER FOR WEIGHT LOSS COUNSELING: Primary | ICD-10-CM

## 2024-01-03 PROBLEM — E63.9 NUTRITIONAL DEFICIENCY: Status: ACTIVE | Noted: 2023-08-29

## 2024-01-03 PROBLEM — D72.10: Status: ACTIVE | Noted: 2024-01-03

## 2024-01-03 PROBLEM — R63.2 POLYPHAGIA: Status: ACTIVE | Noted: 2023-08-29

## 2024-01-03 PROBLEM — K44.9 HIATAL HERNIA: Status: ACTIVE | Noted: 2023-02-02

## 2024-01-03 PROBLEM — J90 PLEURAL EFFUSION, NOT ELSEWHERE CLASSIFIED: Status: ACTIVE | Noted: 2023-02-02

## 2024-01-03 PROBLEM — Z98.82 HISTORY OF BILATERAL BREAST IMPLANTS: Status: ACTIVE | Noted: 2024-01-03

## 2024-01-03 PROBLEM — T50.905A: Status: ACTIVE | Noted: 2024-01-03

## 2024-01-03 PROBLEM — R60.9 EDEMA: Status: ACTIVE | Noted: 2024-01-03

## 2024-01-03 PROBLEM — R30.0 DYSURIA: Status: ACTIVE | Noted: 2024-01-03

## 2024-01-03 PROBLEM — M79.604 PAIN OF RIGHT LOWER EXTREMITY: Status: ACTIVE | Noted: 2022-05-27

## 2024-01-03 PROBLEM — M79.89 SWELLING OF RIGHT LOWER EXTREMITY: Status: ACTIVE | Noted: 2024-01-03

## 2024-01-03 PROBLEM — L29.9 PRURITUS: Status: ACTIVE | Noted: 2024-01-03

## 2024-01-03 PROBLEM — J02.9 PHARYNGITIS: Status: ACTIVE | Noted: 2024-01-03

## 2024-01-03 PROBLEM — T36.95XA ADVERSE REACTION TO ANTIBIOTIC: Status: ACTIVE | Noted: 2024-01-03

## 2024-01-03 PROBLEM — Z20.822 CONTACT WITH AND (SUSPECTED) EXPOSURE TO COVID-19: Status: ACTIVE | Noted: 2023-02-02

## 2024-01-03 PROBLEM — E66.811 CLASS 1 OBESITY DUE TO EXCESS CALORIES WITHOUT SERIOUS COMORBIDITY WITH BODY MASS INDEX (BMI) OF 32.0 TO 32.9 IN ADULT: Status: ACTIVE | Noted: 2024-01-03

## 2024-01-03 PROBLEM — I24.9 ACUTE CORONARY SYNDROME (MULTI): Status: ACTIVE | Noted: 2022-05-15

## 2024-01-03 PROBLEM — R05.9 COUGH: Status: ACTIVE | Noted: 2024-01-03

## 2024-01-03 PROBLEM — K80.50 BILIARY COLIC: Status: ACTIVE | Noted: 2023-04-06

## 2024-01-03 PROBLEM — Z86.39 HISTORY OF HYPOTHYROIDISM: Status: ACTIVE | Noted: 2024-01-03

## 2024-01-03 PROBLEM — T37.95XA: Status: ACTIVE | Noted: 2023-02-02

## 2024-01-03 PROBLEM — R50.9 FEVER: Status: ACTIVE | Noted: 2023-02-02

## 2024-01-03 PROBLEM — I10 ESSENTIAL (PRIMARY) HYPERTENSION: Status: ACTIVE | Noted: 2023-02-02

## 2024-01-03 PROCEDURE — 3079F DIAST BP 80-89 MM HG: CPT | Performed by: INTERNAL MEDICINE

## 2024-01-03 PROCEDURE — 99213 OFFICE O/P EST LOW 20 MIN: CPT | Performed by: INTERNAL MEDICINE

## 2024-01-03 PROCEDURE — 1036F TOBACCO NON-USER: CPT | Performed by: INTERNAL MEDICINE

## 2024-01-03 PROCEDURE — 3008F BODY MASS INDEX DOCD: CPT | Performed by: INTERNAL MEDICINE

## 2024-01-03 PROCEDURE — 3074F SYST BP LT 130 MM HG: CPT | Performed by: INTERNAL MEDICINE

## 2024-01-03 RX ORDER — PHENTERMINE HYDROCHLORIDE 37.5 MG/1
TABLET ORAL
Qty: 30 TABLET | Refills: 0 | Status: SHIPPED | OUTPATIENT
Start: 2024-01-03 | End: 2024-02-21 | Stop reason: ALTCHOICE

## 2024-01-03 RX ORDER — MONTELUKAST SODIUM 10 MG/1
TABLET ORAL
COMMUNITY
Start: 2023-04-10

## 2024-01-03 RX ORDER — INDOMETHACIN 50 MG/1
CAPSULE ORAL
COMMUNITY
Start: 2022-12-09

## 2024-01-03 ASSESSMENT — ENCOUNTER SYMPTOMS
SHORTNESS OF BREATH: 0
DIZZINESS: 0
SLEEP DISTURBANCE: 0
VOMITING: 0
NAUSEA: 0
FEVER: 0
PALPITATIONS: 0
CHILLS: 0
COUGH: 0

## 2024-01-03 NOTE — PROGRESS NOTES
"Subjective   Patient ID: Sivan Underwood is a 43 y.o. female who presents for Follow-up (Weight loss).    HPI patient is a 43-year-old female presents to the office today for weight loss management.  She is starting her fourth month of phentermine.  She has lost greater than 5% of her body weight while on phentermine.  She is also on semaglutide.  She started out with a weight of 198 pounds she is down to 177 pounds.  She is tolerating phentermine well.  States she tried to go up to the dose of 0.5 mg at some magnetized but it has caused some symptoms of possible hypoglycemia so she went back to the 0.25 mg dose.  So has a history of PCOS in the past was on metformin approximately 10 years ago.  She would like to remain on semeglitide at this time.  She already has 2 refills of the medication ordered.  Denies any family history of thyroid cancer or thyroid cancer in herself.  Denies any history of heart disease.  Denies any chance of pregnancy.    Review of Systems   Constitutional:  Negative for chills and fever.   Respiratory:  Negative for cough and shortness of breath.    Cardiovascular:  Negative for chest pain and palpitations.   Gastrointestinal:  Negative for nausea and vomiting.   Skin:  Negative for rash.   Neurological:  Negative for dizziness and syncope.   Psychiatric/Behavioral:  Negative for sleep disturbance.        Objective   /81   Pulse 82   Temp 36.6 °C (97.8 °F) (Temporal)   Ht 1.562 m (5' 1.5\")   Wt 80.3 kg (177 lb)   BMI 32.90 kg/m²     Physical Exam  Vitals and nursing note reviewed.   Constitutional:       General: She is not in acute distress.     Appearance: Normal appearance. She is obese. She is not toxic-appearing.   HENT:      Head: Normocephalic and atraumatic.      Mouth/Throat:      Mouth: Mucous membranes are moist.      Pharynx: Oropharynx is clear. No oropharyngeal exudate.   Cardiovascular:      Rate and Rhythm: Normal rate and regular rhythm.      Heart sounds: Normal " heart sounds.   Pulmonary:      Effort: Pulmonary effort is normal. No respiratory distress.      Breath sounds: Normal breath sounds. No wheezing, rhonchi or rales.   Skin:     General: Skin is warm and dry.   Neurological:      General: No focal deficit present.      Mental Status: She is alert and oriented to person, place, and time.      Cranial Nerves: No cranial nerve deficit.   Psychiatric:         Mood and Affect: Mood normal.         Behavior: Behavior normal.         Thought Content: Thought content normal.         Judgment: Judgment normal.         Assessment/Plan   Problem List Items Addressed This Visit             ICD-10-CM    Encounter for weight loss counseling - Primary Z71.3    Relevant Medications    phentermine (Adipex-P) 37.5 mg tablet    Class 1 obesity due to excess calories without serious comorbidity with body mass index (BMI) of 32.0 to 32.9 in adult E66.09, Z68.32    Relevant Medications    phentermine (Adipex-P) 37.5 mg tablet    Obesity (BMI 30-39.9) E66.9     Encounter for weight loss counseling/obesity: Refills provided for phentermine today.  She is tolerating medication well.  She is lost 5% of her body weight since starting the medication 3 months ago.  No adverse effects.  Will continue phentermine and compounding pharmacy semaglutide.  Follow-up in 1 month for recheck

## 2024-01-10 ENCOUNTER — TELEMEDICINE (OUTPATIENT)
Dept: BEHAVIORAL HEALTH | Facility: CLINIC | Age: 44
End: 2024-01-10
Payer: COMMERCIAL

## 2024-01-10 DIAGNOSIS — Z00.00 HEALTH CARE MAINTENANCE: ICD-10-CM

## 2024-01-10 DIAGNOSIS — F90.0 ADHD (ATTENTION DEFICIT HYPERACTIVITY DISORDER), INATTENTIVE TYPE: ICD-10-CM

## 2024-01-10 DIAGNOSIS — F43.22 ADJUSTMENT DISORDER WITH ANXIOUS MOOD: ICD-10-CM

## 2024-01-10 DIAGNOSIS — F31.62 BIPOLAR 1 DISORDER, MIXED, MODERATE (MULTI): ICD-10-CM

## 2024-01-10 DIAGNOSIS — Z79.899 MEDICATION MANAGEMENT: ICD-10-CM

## 2024-01-10 PROCEDURE — 99215 OFFICE O/P EST HI 40 MIN: CPT | Performed by: CLINICAL NURSE SPECIALIST

## 2024-01-10 RX ORDER — QUETIAPINE FUMARATE 50 MG/1
50 TABLET, FILM COATED ORAL NIGHTLY
Qty: 30 TABLET | Refills: 1 | Status: SHIPPED | OUTPATIENT
Start: 2024-01-10 | End: 2024-02-21 | Stop reason: SDUPTHER

## 2024-01-10 RX ORDER — LISDEXAMFETAMINE DIMESYLATE CAPSULES 20 MG/1
20 CAPSULE ORAL EVERY MORNING
Qty: 30 CAPSULE | Refills: 0 | Status: SHIPPED | OUTPATIENT
Start: 2024-01-10 | End: 2024-01-16

## 2024-01-10 RX ORDER — LURASIDONE HYDROCHLORIDE 60 MG/1
60 TABLET, FILM COATED ORAL
Qty: 30 TABLET | Refills: 1 | Status: SHIPPED | OUTPATIENT
Start: 2024-01-10 | End: 2024-02-21 | Stop reason: SDUPTHER
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
## 2024-01-10 NOTE — ASSESSMENT & PLAN NOTE
Tolerated titration of lurasidone without side effects and with good benefit for mood, in combination with bupropion. No indication for further medication changes today. May consider further titration if mood sx continue to present significant disruption in the 7-10 days prior to menses. Encouraged to also discuss this with GYN.   Continue lurasidone 60 mg PO daily with food. -refill sent to pharmacy today.   Continue quetiapine 50 mg PO at bedtime- goal is to gradually taper and discontinue d/t concern for metabolic side effects. -refill sent to pharmacy today.   Continue bupropion  mg PO BID- refill sent to pharmacy today.   Recently established with new individual therapist- first visit 1/17.

## 2024-01-10 NOTE — ASSESSMENT & PLAN NOTE
>>ASSESSMENT AND PLAN FOR ATTENTION DISTURBANCE WRITTEN ON 1/10/2024  5:15 PM BY RAUDEL WASHINGTON, APRN-CNP, APRN-CNS    As noted in previous assessment- Completed BAARS-IV assessment in August 2023 and at that time results taken together with clinical interview did not support ADHD dx. Possible that poorly controlled mood sx may have confounded results. She has recently been taking phentermine under care of PCP for weight loss, and has noted significant improvement in inattentive sx she has historically endorsed.     Mood sx have been relatively stable. Noting persistent and disruptive inattentive sx, and now beginning taper off of phentermine. Reasonable to trial low-dose stimulant medication for likely ADHD. Advised not to take stimulant medication on days she is still taking phentermine. Orders placed for UDS today as well.     Start Vyvanse 20 mg PO daily. Reviewed OARRS, no discrepancies or concerns. Discussed risk of insomnia, anxiety, agitation, anorexia, autonomic effects, toxicity, psychosis, dependence, tolerance, abuse.

## 2024-01-10 NOTE — ASSESSMENT & PLAN NOTE
As noted in previous assessment- Completed BAARS-IV assessment in August 2023 and at that time results taken together with clinical interview did not support ADHD dx. Possible that poorly controlled mood sx may have confounded results. She has recently been taking phentermine under care of PCP for weight loss, and has noted significant improvement in inattentive sx she has historically endorsed.     Mood sx have been relatively stable. Noting persistent and disruptive inattentive sx, and now beginning taper off of phentermine. Reasonable to trial low-dose stimulant medication for likely ADHD. Advised not to take stimulant medication on days she is still taking phentermine. Orders placed for UDS today as well.     Start Vyvanse 20 mg PO daily. Reviewed OARRS, no discrepancies or concerns. Discussed risk of insomnia, anxiety, agitation, anorexia, autonomic effects, toxicity, psychosis, dependence, tolerance, abuse.

## 2024-01-16 ENCOUNTER — CLINICAL SUPPORT (OUTPATIENT)
Dept: ALLERGY | Facility: CLINIC | Age: 44
End: 2024-01-16
Payer: COMMERCIAL

## 2024-01-16 DIAGNOSIS — L23.9 ALLERGIC CONTACT DERMATITIS, UNSPECIFIED TRIGGER: ICD-10-CM

## 2024-01-16 DIAGNOSIS — L27.0 DERMATITIS MEDICAMENTOSA DUE TO INGESTED DRUGS: ICD-10-CM

## 2024-01-16 DIAGNOSIS — F90.0 ADHD (ATTENTION DEFICIT HYPERACTIVITY DISORDER), INATTENTIVE TYPE: ICD-10-CM

## 2024-01-16 PROCEDURE — 96372 THER/PROPH/DIAG INJ SC/IM: CPT | Performed by: ALLERGY & IMMUNOLOGY

## 2024-01-16 RX ORDER — DEXTROAMPHETAMINE SACCHARATE, AMPHETAMINE ASPARTATE MONOHYDRATE, DEXTROAMPHETAMINE SULFATE AND AMPHETAMINE SULFATE 3.75; 3.75; 3.75; 3.75 MG/1; MG/1; MG/1; MG/1
15 CAPSULE, EXTENDED RELEASE ORAL DAILY
Qty: 30 CAPSULE | Refills: 0 | Status: SHIPPED | OUTPATIENT
Start: 2024-01-16 | End: 2024-02-13 | Stop reason: SDUPTHER

## 2024-01-29 ENCOUNTER — APPOINTMENT (OUTPATIENT)
Dept: PRIMARY CARE | Facility: CLINIC | Age: 44
End: 2024-01-29
Payer: COMMERCIAL

## 2024-01-30 ENCOUNTER — DOCUMENTATION (OUTPATIENT)
Dept: ALLERGY | Facility: CLINIC | Age: 44
End: 2024-01-30
Payer: COMMERCIAL

## 2024-01-30 DIAGNOSIS — L20.9 ATOPIC DERMATITIS, UNSPECIFIED TYPE: Primary | ICD-10-CM

## 2024-01-30 DIAGNOSIS — L27.0 DERMATITIS MEDICAMENTOSA DUE TO INGESTED DRUGS: ICD-10-CM

## 2024-01-30 NOTE — PROGRESS NOTES
Patient has a history of severe persistent atopic dermatitis.  She has been well-controlled on Dupixent every other week and we then try to spread out the Dupixent to every 3 weeks.  Unfortunately, the rash is worsened.  Prior to starting the Dupixent she was on multiple topical steroids and calcineurin inhibitors.  I would like her to go back to every other week Dupixent.

## 2024-02-06 ENCOUNTER — LAB (OUTPATIENT)
Dept: LAB | Facility: LAB | Age: 44
End: 2024-02-06
Payer: COMMERCIAL

## 2024-02-06 ENCOUNTER — CLINICAL SUPPORT (OUTPATIENT)
Dept: ALLERGY | Facility: CLINIC | Age: 44
End: 2024-02-06
Payer: COMMERCIAL

## 2024-02-06 DIAGNOSIS — Z79.899 MEDICATION MANAGEMENT: ICD-10-CM

## 2024-02-06 DIAGNOSIS — Z00.00 HEALTH CARE MAINTENANCE: ICD-10-CM

## 2024-02-06 LAB
AMPHETAMINES UR QL SCN: ABNORMAL
BARBITURATES UR QL SCN: ABNORMAL
BENZODIAZ UR QL SCN: ABNORMAL
BZE UR QL SCN: ABNORMAL
CANNABINOIDS UR QL SCN: ABNORMAL
FENTANYL+NORFENTANYL UR QL SCN: ABNORMAL
OPIATES UR QL SCN: ABNORMAL
OXYCODONE+OXYMORPHONE UR QL SCN: ABNORMAL
PCP UR QL SCN: ABNORMAL

## 2024-02-06 PROCEDURE — 80324 DRUG SCREEN AMPHETAMINES 1/2: CPT

## 2024-02-06 PROCEDURE — 80307 DRUG TEST PRSMV CHEM ANLYZR: CPT

## 2024-02-06 PROCEDURE — 96372 THER/PROPH/DIAG INJ SC/IM: CPT | Performed by: ALLERGY & IMMUNOLOGY

## 2024-02-10 LAB
AMPHET UR-MCNC: >5000 NG/ML
MDA UR-MCNC: <200 NG/ML
MDEA UR-MCNC: <200 NG/ML
MDMA UR-MCNC: <200 NG/ML
METHAMPHET UR-MCNC: <200 NG/ML
PHENTERMINE UR CFM-MCNC: <200 NG/ML

## 2024-02-13 DIAGNOSIS — F90.0 ADHD (ATTENTION DEFICIT HYPERACTIVITY DISORDER), INATTENTIVE TYPE: ICD-10-CM

## 2024-02-13 RX ORDER — DEXTROAMPHETAMINE SACCHARATE, AMPHETAMINE ASPARTATE MONOHYDRATE, DEXTROAMPHETAMINE SULFATE AND AMPHETAMINE SULFATE 3.75; 3.75; 3.75; 3.75 MG/1; MG/1; MG/1; MG/1
15 CAPSULE, EXTENDED RELEASE ORAL DAILY
Qty: 30 CAPSULE | Refills: 0 | Status: SHIPPED | OUTPATIENT
Start: 2024-02-13 | End: 2024-02-21 | Stop reason: DRUGHIGH

## 2024-02-20 ENCOUNTER — CLINICAL SUPPORT (OUTPATIENT)
Dept: ALLERGY | Facility: CLINIC | Age: 44
End: 2024-02-20
Payer: COMMERCIAL

## 2024-02-20 DIAGNOSIS — L27.0 DERMATITIS MEDICAMENTOSA DUE TO INGESTED DRUGS: ICD-10-CM

## 2024-02-20 DIAGNOSIS — L23.9 ALLERGIC CONTACT DERMATITIS, UNSPECIFIED TRIGGER: ICD-10-CM

## 2024-02-20 PROCEDURE — 96372 THER/PROPH/DIAG INJ SC/IM: CPT | Performed by: ALLERGY & IMMUNOLOGY

## 2024-02-21 ENCOUNTER — TELEMEDICINE (OUTPATIENT)
Dept: BEHAVIORAL HEALTH | Facility: CLINIC | Age: 44
End: 2024-02-21
Payer: COMMERCIAL

## 2024-02-21 DIAGNOSIS — R41.840 ATTENTION DISTURBANCE: ICD-10-CM

## 2024-02-21 DIAGNOSIS — F43.22 ADJUSTMENT DISORDER WITH ANXIOUS MOOD: ICD-10-CM

## 2024-02-21 DIAGNOSIS — F31.62 BIPOLAR 1 DISORDER, MIXED, MODERATE (MULTI): ICD-10-CM

## 2024-02-21 DIAGNOSIS — F90.0 ADHD (ATTENTION DEFICIT HYPERACTIVITY DISORDER), INATTENTIVE TYPE: ICD-10-CM

## 2024-02-21 PROCEDURE — 99214 OFFICE O/P EST MOD 30 MIN: CPT | Performed by: CLINICAL NURSE SPECIALIST

## 2024-02-21 PROCEDURE — 1036F TOBACCO NON-USER: CPT | Performed by: CLINICAL NURSE SPECIALIST

## 2024-02-21 PROCEDURE — 3008F BODY MASS INDEX DOCD: CPT | Performed by: CLINICAL NURSE SPECIALIST

## 2024-02-21 RX ORDER — BUPROPION HYDROCHLORIDE 100 MG/1
100 TABLET, EXTENDED RELEASE ORAL 2 TIMES DAILY
Qty: 180 TABLET | Refills: 1 | Status: SHIPPED | OUTPATIENT
Start: 2024-02-21 | End: 2024-08-19

## 2024-02-21 RX ORDER — LURASIDONE HYDROCHLORIDE 60 MG/1
60 TABLET, FILM COATED ORAL
Qty: 30 TABLET | Refills: 1 | Status: SHIPPED | OUTPATIENT
Start: 2024-02-21 | End: 2024-05-09 | Stop reason: WASHOUT

## 2024-02-21 RX ORDER — QUETIAPINE FUMARATE 50 MG/1
50 TABLET, FILM COATED ORAL NIGHTLY
Qty: 30 TABLET | Refills: 1 | Status: SHIPPED | OUTPATIENT
Start: 2024-02-21 | End: 2024-05-09 | Stop reason: SDUPTHER

## 2024-02-21 RX ORDER — DEXTROAMPHETAMINE SACCHARATE, AMPHETAMINE ASPARTATE MONOHYDRATE, DEXTROAMPHETAMINE SULFATE AND AMPHETAMINE SULFATE 5; 5; 5; 5 MG/1; MG/1; MG/1; MG/1
20 CAPSULE, EXTENDED RELEASE ORAL DAILY
Qty: 30 CAPSULE | Refills: 0 | Status: SHIPPED | OUTPATIENT
Start: 2024-03-12 | End: 2024-04-15 | Stop reason: SDUPTHER

## 2024-02-21 NOTE — PROGRESS NOTES
"Outpatient Psychiatry      Subjective   Tete Underwood \"Sivan\", is a 43 y.o. female,  seen in follow-up.    Assessment/Plan   Problem List Items Addressed This Visit             ICD-10-CM    Bipolar 1 disorder, mixed, moderate (CMS/HCC) F31.62     Tolerating and benefiting from current regimen. No indication for medication changes today.  Sporadically experiencing nausea/emesis that she attributes to lurasidone (only identifiable change since sx arose). Happening ~once/month. Initially experienced more frequent nausea that has improved overall.  Mood sx continue to worsen and present significant disruption in the 7-10 days prior to menses. Encouraged to discuss this with GYN, as we have trialed increased dose of quetiapine and lurasidone without significant benefit from either.   Continue lurasidone 60 mg PO daily with food. -refill sent to pharmacy today.   Continue quetiapine 50 mg PO at bedtime- goal is to gradually taper and discontinue d/t concern for metabolic side effects. -refill sent to pharmacy today.   Continue bupropion  mg PO BID- refill sent to pharmacy today.   Recently established with new individual therapist.         Relevant Medications    buPROPion SR (Wellbutrin SR) 100 mg 12 hr tablet    lurasidone (Latuda) 60 mg tablet    QUEtiapine (SEROquel) 50 mg tablet    Other Relevant Orders    Follow Up In Psychiatry    Adjustment disorder with anxious mood F43.22     Continue plan as noted above.          Relevant Orders    Follow Up In Psychiatry    ADHD (attention deficit hyperactivity disorder), inattentive type F90.0     Unable to fill Vyvanse d/t cost. Started low-dose Adderall ER with modest benefit, but still experiencing significant residual sx. Reasonable to gently titrate at next fill.     Increase Adderall ER to 25 mg PO daily- rx placed on file at pharmacy.     She has completed course of treatment with phentermine for weight loss.   UDS completed 2/6/2024- consistent with rx, " negative for all other substances.     Reviewed OARRS, no discrepancies or concerns. Discussed risk of insomnia, anxiety, agitation, anorexia, autonomic effects, toxicity, psychosis, dependence, tolerance, abuse.          Relevant Medications    amphetamine-dextroamphetamine XR (Adderall XR) 20 mg 24 hr capsule (Start on 3/12/2024)    Other Relevant Orders    Follow Up In Psychiatry     Other Visit Diagnoses         Codes    Attention disturbance     R41.840    Relevant Medications    buPROPion SR (Wellbutrin SR) 100 mg 12 hr tablet            Follow-up in 4-6 weeks.     Risk Assessment:  Risk Assessment: Tete Underwood is currently a low acute risk of suicide and self-harm due to no past suicide attempt(s) and not currently endorsing thoughts of suicide. Tete Underwood is currently a low acute risk of violence and harm to others due to no past history of violence and not currently threatening others.  Suicidal Risk Factors: , history of trauma/abuse, chronic medical illness, and current psychiatric illness  Violence Risk Factors: none  Protective Factors: strong coping skills, sense of responsibility towards family, social support/connectedness, moral objections to suicide, child related concerns/living with child <18 years, hopefulness/future orientation, and employment     Provided with crisis/emergency resources, including Mobile Crisis [phone], Crisis Text Line (ghaa [id number] rl [id number]) and the National Suicide Prevention Lifeline hotline 1-921.665.6667. Agrees to call 988 or go to the nearest emergency department if she feels unsafe, or has suicidal thinking with a plan or intent.       Reason for Visit:  Follow-up for medication management.     HPI:  Since her last visit,     Doing ok.   Scheduled to start seeing a new therapist next month.   Daughter is doing well- seeing therapist and also planning to start working with someone with specific expertise in trauma.   Talking with significant  "other about breaking up. He has not been getting along with her daughter.   Daughter's father dropped all of her stuff of and told her he wants nothing to do with her.     Planned trip for her and her daughter in the first week of April.   Looking forward to the two of them spending time together and getting away for a little while. \"I definitely think we both need it.\"     Unable to fill Vyvanse (generic) d/t cost. Switched to Adderall ER, which she is tolerating, but feels like it could be slightly more beneficial.   When she's taken similar medications in the past, noted more significant benefit. Wondering whether it will take some time or dose needs to be adjusted.     Still experiencing intermittent nausea from lurasidone- maybe once/month will experience nausea/vomiting. No obvious pattern or predictability to it.     Denies suicidal ideation or a passive death wish.     No new medications or health problems.       Current Psychiatric Medications:  Alprazolam 0.25 mg PO PRN for severe anxiety/panic- managed by PCP (rarely taking)  Bupropion  mg PO BID  Lurasidone 60 mg PO daily with food  Quetiapine 50 mg PO QHS    Record Review: brief     Medical Review Of Systems:  Pertinent items are noted in HPI.    Psychiatric Review Of Systems:  As noted in HPI.        Objective   Mental Status Exam  General Appearance: Well groomed, appropriate eye contact  Attitude/Behavior: Cooperative  Motor: No psychomotor agitation or retardation, no tremor or other abnormal movements  Speech: Normal rate, volume, prosody  Gait/Station: Other:(comment) (not observed- virtual)  Mood: \"Ok.\"  Affect: Sad/Tearful, Dysphoric, constricted but reactive, Congruent with mood and topic of conversation  Thought Process: Linear, goal directed  Thought Associations: No loosening of associations  Thought Content: Normal, Other: (comment) (anxious and depressive themes, especially r/t relationship strain.)  Perception: No perceptual " abnormalities noted  Sensorium: Alert and oriented to person, place, time and situation  Insight: Intact  Judgement: Intact  Cognition: Cognitively intact to conversational testing with respect to attention, orientation, fund of knowledge, recent and remote memory, and language, Other: (comment) (subjectively endorses ongoing inattentive sx.)    Vitals:  There were no vitals filed for this visit.    Labs:  Lab on 02/06/2024   Component Date Value    Amphetamine Screen, Urine 02/06/2024 Presumptive Positive (A)     Barbiturate Screen, Urine 02/06/2024 Presumptive Negative     Benzodiazepines Screen, * 02/06/2024 Presumptive Negative     Cannabinoid Screen, Urine 02/06/2024 Presumptive Negative     Cocaine Metabolite Scree* 02/06/2024 Presumptive Negative     Fentanyl Screen, Urine 02/06/2024 Presumptive Negative     Opiate Screen, Urine 02/06/2024 Presumptive Negative     Oxycodone Screen, Urine 02/06/2024 Presumptive Negative     PCP Screen, Urine 02/06/2024 Presumptive Negative     Methamphetamine Quant, Ur 02/06/2024 <200     MDA, Urine 02/06/2024 <200     MDEA, Urine 02/06/2024 <200     Phentermine,Urine 02/06/2024 <200     Amphetamines,Urine 02/06/2024 >5000     MDMA, Urine 02/06/2024 <200          Rachel Small, APRN-CNP, APRN-CNS

## 2024-02-21 NOTE — ASSESSMENT & PLAN NOTE
Continue plan as noted above.   
Tolerating and benefiting from current regimen. No indication for medication changes today.  Sporadically experiencing nausea/emesis that she attributes to lurasidone (only identifiable change since sx arose). Happening ~once/month. Initially experienced more frequent nausea that has improved overall.  Mood sx continue to worsen and present significant disruption in the 7-10 days prior to menses. Encouraged to discuss this with GYN, as we have trialed increased dose of quetiapine and lurasidone without significant benefit from either.   Continue lurasidone 60 mg PO daily with food. -refill sent to pharmacy today.   Continue quetiapine 50 mg PO at bedtime- goal is to gradually taper and discontinue d/t concern for metabolic side effects. -refill sent to pharmacy today.   Continue bupropion  mg PO BID- refill sent to pharmacy today.   Recently established with new individual therapist.  
Unable to fill Vyvanse d/t cost. Started low-dose Adderall ER with modest benefit, but still experiencing significant residual sx. Reasonable to gently titrate at next fill.     Increase Adderall ER to 25 mg PO daily- rx placed on file at pharmacy.     She has completed course of treatment with phentermine for weight loss.   UDS completed 2/6/2024- consistent with rx, negative for all other substances.     Reviewed OARRS, no discrepancies or concerns. Discussed risk of insomnia, anxiety, agitation, anorexia, autonomic effects, toxicity, psychosis, dependence, tolerance, abuse.   
ascorbic acid 500 mg oral tablet: 1 tab(s) orally once a day  fludrocortisone 0.1 mg oral tablet: 1 tab(s) orally 2 times a day  midodrine 5 mg oral tablet: 1 tab(s) orally 3 times a day  Multiple Vitamins oral tablet: 1 tab(s) orally once a day  thiamine 100 mg oral tablet: 1 tab(s) orally once a day

## 2024-03-01 ENCOUNTER — SPECIALTY PHARMACY (OUTPATIENT)
Dept: PHARMACY | Facility: CLINIC | Age: 44
End: 2024-03-01

## 2024-03-01 ENCOUNTER — DOCUMENTATION (OUTPATIENT)
Dept: ALLERGY | Facility: CLINIC | Age: 44
End: 2024-03-01
Payer: COMMERCIAL

## 2024-03-01 PROCEDURE — RXMED WILLOW AMBULATORY MEDICATION CHARGE

## 2024-03-01 NOTE — PROGRESS NOTES
Prior to starting Dupixent patient's entire surface area (>25%) was affected except for her palms and soles. She had tried Betamethasone proprionate BID prior to starting Dupixent.     Dupixent has been helpful without side effects.     Medically indicated to stay on Dupixent. Tried to spread out to every three weeks and generalized itching returned. Currently on every two weeks.

## 2024-03-04 ENCOUNTER — PHARMACY VISIT (OUTPATIENT)
Dept: PHARMACY | Facility: CLINIC | Age: 44
End: 2024-03-04
Payer: COMMERCIAL

## 2024-03-05 ENCOUNTER — CLINICAL SUPPORT (OUTPATIENT)
Dept: ALLERGY | Facility: CLINIC | Age: 44
End: 2024-03-05
Payer: COMMERCIAL

## 2024-03-05 DIAGNOSIS — L23.9 ALLERGIC CONTACT DERMATITIS, UNSPECIFIED TRIGGER: ICD-10-CM

## 2024-03-05 DIAGNOSIS — L27.0 DERMATITIS MEDICAMENTOSA DUE TO INGESTED DRUGS: ICD-10-CM

## 2024-03-05 PROCEDURE — 96372 THER/PROPH/DIAG INJ SC/IM: CPT | Performed by: ALLERGY & IMMUNOLOGY

## 2024-03-18 ENCOUNTER — HOSPITAL ENCOUNTER (OUTPATIENT)
Dept: RADIOLOGY | Facility: HOSPITAL | Age: 44
Discharge: HOME | End: 2024-03-18
Payer: COMMERCIAL

## 2024-03-18 VITALS — HEIGHT: 62 IN | BODY MASS INDEX: 30.36 KG/M2 | WEIGHT: 165 LBS

## 2024-03-18 DIAGNOSIS — Z12.31 ENCOUNTER FOR SCREENING MAMMOGRAM FOR MALIGNANT NEOPLASM OF BREAST: ICD-10-CM

## 2024-03-18 PROCEDURE — 77063 BREAST TOMOSYNTHESIS BI: CPT | Mod: BILATERAL PROCEDURE | Performed by: RADIOLOGY

## 2024-03-18 PROCEDURE — 77067 SCR MAMMO BI INCL CAD: CPT | Mod: BILATERAL PROCEDURE | Performed by: RADIOLOGY

## 2024-03-18 PROCEDURE — 77067 SCR MAMMO BI INCL CAD: CPT

## 2024-03-19 ENCOUNTER — CLINICAL SUPPORT (OUTPATIENT)
Dept: ALLERGY | Facility: CLINIC | Age: 44
End: 2024-03-19
Payer: COMMERCIAL

## 2024-03-19 DIAGNOSIS — L23.9 ALLERGIC CONTACT DERMATITIS, UNSPECIFIED TRIGGER: ICD-10-CM

## 2024-03-19 DIAGNOSIS — L27.0 DERMATITIS MEDICAMENTOSA DUE TO INGESTED DRUGS: ICD-10-CM

## 2024-03-19 PROCEDURE — 96372 THER/PROPH/DIAG INJ SC/IM: CPT | Performed by: ALLERGY & IMMUNOLOGY

## 2024-03-27 ENCOUNTER — SPECIALTY PHARMACY (OUTPATIENT)
Dept: PHARMACY | Facility: CLINIC | Age: 44
End: 2024-03-27

## 2024-03-27 ENCOUNTER — TELEMEDICINE (OUTPATIENT)
Dept: BEHAVIORAL HEALTH | Facility: CLINIC | Age: 44
End: 2024-03-27
Payer: COMMERCIAL

## 2024-03-27 DIAGNOSIS — F43.22 ADJUSTMENT DISORDER WITH ANXIOUS MOOD: ICD-10-CM

## 2024-03-27 DIAGNOSIS — F31.62 BIPOLAR 1 DISORDER, MIXED, MODERATE (MULTI): ICD-10-CM

## 2024-03-27 DIAGNOSIS — F90.0 ADHD (ATTENTION DEFICIT HYPERACTIVITY DISORDER), INATTENTIVE TYPE: ICD-10-CM

## 2024-03-27 PROBLEM — E66.9 OBESITY WITH BODY MASS INDEX 30 OR GREATER: Status: ACTIVE | Noted: 2024-01-03

## 2024-03-27 PROBLEM — I10 PRIMARY HYPERTENSION: Status: ACTIVE | Noted: 2023-02-02

## 2024-03-27 PROBLEM — O09.90 SUPERVISION OF HIGH RISK PREGNANCY, UNSPECIFIED, UNSPECIFIED TRIMESTER (HHS-HCC): Status: ACTIVE | Noted: 2023-10-22

## 2024-03-27 PROBLEM — J90 PLEURAL EFFUSION: Status: ACTIVE | Noted: 2023-02-02

## 2024-03-27 PROBLEM — E66.9 OBESITY DUE TO ENERGY IMBALANCE: Status: ACTIVE | Noted: 2024-01-03

## 2024-03-27 PROBLEM — Z79.899 OTHER LONG TERM (CURRENT) DRUG THERAPY: Status: ACTIVE | Noted: 2023-02-02

## 2024-03-27 PROCEDURE — 99213 OFFICE O/P EST LOW 20 MIN: CPT | Performed by: CLINICAL NURSE SPECIALIST

## 2024-03-27 PROCEDURE — 3008F BODY MASS INDEX DOCD: CPT | Performed by: CLINICAL NURSE SPECIALIST

## 2024-03-27 PROCEDURE — RXMED WILLOW AMBULATORY MEDICATION CHARGE

## 2024-03-27 NOTE — PROGRESS NOTES
"Outpatient Psychiatry      Subjective   Tete Underwood \"Sivan\", is a 44 y.o. female,  seen in follow-up.      Assessment/Plan   Problem List Items Addressed This Visit             ICD-10-CM    Bipolar 1 disorder, mixed, moderate (Multi) F31.62    Relevant Orders    Follow Up In Psychiatry    Adjustment disorder with anxious mood F43.22    Relevant Orders    Follow Up In Psychiatry    ADHD (attention deficit hyperactivity disorder), inattentive type F90.0    Relevant Orders    Follow Up In Psychiatry     Assessment:  Tolerating and benefiting from current regimen. No indication for medication changes today.      Reviewed OARRS, no discrepancies or concerns. Discussed risk of insomnia, anxiety, agitation, anorexia, autonomic effects, toxicity, psychosis, dependence, tolerance, abuse.     Plan:  Continue alprazolam 0.25 mg PO PRN for severe anxiety/panic (managed by PCP- rarely taking)  Continue bupropion  mg PO BID  Continue lurasidone 60 mg PO daily with food  Continue quetiapine 50 mg PO at bedtime  Continue Adderall XR 25 mg PO daily  Agree with plan to begin engagement in individual therapy.     Follow-up in 4-6 weeks.     Risk Assessment:  Risk Assessment: Tete Underwood is currently a low acute risk of suicide and self-harm due to no past suicide attempt(s) and not currently endorsing thoughts of suicide. Tete Underwood is currently a low acute risk of violence and harm to others due to no past history of violence and not currently threatening others.  Suicidal Risk Factors: , history of trauma/abuse, chronic medical illness, and current psychiatric illness  Violence Risk Factors: none  Protective Factors: strong coping skills, sense of responsibility towards family, social support/connectedness, moral objections to suicide, child related concerns/living with child <18 years, hopefulness/future orientation, and employment     Provided with crisis/emergency resources, including Mobile Crisis " "[phone], Crisis Text Line (text 2MRWX bz [id number]) and the National Suicide Prevention Lifeline hotline 1-573.366.9229. Agrees to call 988 or go to the nearest emergency department if she feels unsafe, or has suicidal thinking with a plan or intent.     Reason for Visit:  Follow-up for medication management.     HPI:  Since her last visit,     Things have been pretty good.   Went to court for daughter's case ~2 weeks ago.   Perpetrator plead no contest and sentenced.     Trying to work on relationship with s/o.   Still some difficulty with trust given some of his past behavior.   Planning to start individual therapy. Hoping to do so now that things are settling down some with daughter/court.     Mood has been ok.   Still noticing a lot of mood fluctuation around her period. Planning to discuss with OB/GYN, hasn't had a chance to call and schedule an appointment.   Noting significant benefit from Adderall for inattentive sx. Has helped with work performance significantly.   No concerns re: medications otherwise.     Denies suicidal ideation or a passive death wish.     No new medications or health problems.       Current Psychiatric Medications:  Alprazolam 0.25 mg PO PRN for severe anxiety/panic- managed by PCP (rarely taking)  Bupropion  mg PO BID  Lurasidone 60 mg PO daily with food  Quetiapine 50 mg PO at bedtime  Adderall XR 25 mg PO daily      Record Review: brief     Medical Review Of Systems:  Pertinent items are noted in HPI.    Psychiatric Review Of Systems:  As noted in HPI.        Objective   Mental Status Exam   General Appearance: Well groomed, appropriate eye contact  Attitude/Behavior: Cooperative  Motor: No psychomotor agitation or retardation, no tremor or other abnormal movements  Speech: Normal rate, volume, prosody  Gait/Station: Other:(comment) (not observed- virtual)  Mood: \"Good.\"  Affect: Full, euthymic, Congruent with mood and topic of conversation  Thought Process: Linear, goal " directed  Thought Associations: No loosening of associations  Thought Content: Normal, Other: (comment) (overall fewer anxious and depressive themes, some ongoing anxious themes r/t relationship strain.)  Perception: No perceptual abnormalities noted  Sensorium: Alert and oriented to person, place, time and situation  Insight: Intact  Judgement: Intact  Cognition: Cognitively intact to conversational testing with respect to attention, orientation, fund of knowledge, recent and remote memory, and language.    Vitals:  There were no vitals filed for this visit.    Labs:  not applicable        Rachel Small, APRN-CNP, APRN-CNS

## 2024-03-28 ENCOUNTER — PHARMACY VISIT (OUTPATIENT)
Dept: PHARMACY | Facility: CLINIC | Age: 44
End: 2024-03-28
Payer: COMMERCIAL

## 2024-03-28 ENCOUNTER — SPECIALTY PHARMACY (OUTPATIENT)
Dept: PHARMACY | Facility: CLINIC | Age: 44
End: 2024-03-28

## 2024-04-06 DIAGNOSIS — F31.62 BIPOLAR 1 DISORDER, MIXED, MODERATE (MULTI): ICD-10-CM

## 2024-04-08 RX ORDER — CARIPRAZINE 1.5 MG/1
1.5 CAPSULE, GELATIN COATED ORAL DAILY
Qty: 14 CAPSULE | Refills: 0 | Status: SHIPPED | OUTPATIENT
Start: 2024-04-08 | End: 2024-04-09 | Stop reason: DRUGHIGH

## 2024-04-09 ENCOUNTER — APPOINTMENT (OUTPATIENT)
Dept: ALLERGY | Facility: CLINIC | Age: 44
End: 2024-04-09
Payer: COMMERCIAL

## 2024-04-09 DIAGNOSIS — F31.62 BIPOLAR 1 DISORDER, MIXED, MODERATE (MULTI): ICD-10-CM

## 2024-04-11 ENCOUNTER — TELEPHONE (OUTPATIENT)
Dept: BEHAVIORAL HEALTH | Facility: CLINIC | Age: 44
End: 2024-04-11
Payer: COMMERCIAL

## 2024-04-11 ENCOUNTER — PATIENT MESSAGE (OUTPATIENT)
Dept: BEHAVIORAL HEALTH | Facility: CLINIC | Age: 44
End: 2024-04-11
Payer: COMMERCIAL

## 2024-04-15 DIAGNOSIS — F90.0 ADHD (ATTENTION DEFICIT HYPERACTIVITY DISORDER), INATTENTIVE TYPE: ICD-10-CM

## 2024-04-15 RX ORDER — DEXTROAMPHETAMINE SACCHARATE, AMPHETAMINE ASPARTATE MONOHYDRATE, DEXTROAMPHETAMINE SULFATE AND AMPHETAMINE SULFATE 5; 5; 5; 5 MG/1; MG/1; MG/1; MG/1
20 CAPSULE, EXTENDED RELEASE ORAL DAILY
Qty: 30 CAPSULE | Refills: 0 | Status: SHIPPED | OUTPATIENT
Start: 2024-04-15 | End: 2024-05-09 | Stop reason: SDUPTHER

## 2024-04-22 ENCOUNTER — SPECIALTY PHARMACY (OUTPATIENT)
Dept: PHARMACY | Facility: CLINIC | Age: 44
End: 2024-04-22

## 2024-04-22 PROCEDURE — RXMED WILLOW AMBULATORY MEDICATION CHARGE

## 2024-04-23 ENCOUNTER — APPOINTMENT (OUTPATIENT)
Dept: ALLERGY | Facility: CLINIC | Age: 44
End: 2024-04-23
Payer: COMMERCIAL

## 2024-05-02 ENCOUNTER — PHARMACY VISIT (OUTPATIENT)
Dept: PHARMACY | Facility: CLINIC | Age: 44
End: 2024-05-02
Payer: COMMERCIAL

## 2024-05-09 ENCOUNTER — TELEMEDICINE (OUTPATIENT)
Dept: BEHAVIORAL HEALTH | Facility: CLINIC | Age: 44
End: 2024-05-09
Payer: COMMERCIAL

## 2024-05-09 DIAGNOSIS — F90.0 ADHD (ATTENTION DEFICIT HYPERACTIVITY DISORDER), INATTENTIVE TYPE: ICD-10-CM

## 2024-05-09 DIAGNOSIS — F31.62 BIPOLAR 1 DISORDER, MIXED, MODERATE (MULTI): ICD-10-CM

## 2024-05-09 DIAGNOSIS — F43.22 ADJUSTMENT DISORDER WITH ANXIOUS MOOD: ICD-10-CM

## 2024-05-09 PROCEDURE — 3008F BODY MASS INDEX DOCD: CPT | Performed by: CLINICAL NURSE SPECIALIST

## 2024-05-09 PROCEDURE — 99213 OFFICE O/P EST LOW 20 MIN: CPT | Performed by: CLINICAL NURSE SPECIALIST

## 2024-05-09 RX ORDER — DEXTROAMPHETAMINE SACCHARATE, AMPHETAMINE ASPARTATE MONOHYDRATE, DEXTROAMPHETAMINE SULFATE AND AMPHETAMINE SULFATE 5; 5; 5; 5 MG/1; MG/1; MG/1; MG/1
20 CAPSULE, EXTENDED RELEASE ORAL DAILY
Qty: 30 CAPSULE | Refills: 0 | Status: SHIPPED | OUTPATIENT
Start: 2024-05-13 | End: 2024-06-12

## 2024-05-09 RX ORDER — DEXTROAMPHETAMINE SACCHARATE, AMPHETAMINE ASPARTATE MONOHYDRATE, DEXTROAMPHETAMINE SULFATE AND AMPHETAMINE SULFATE 5; 5; 5; 5 MG/1; MG/1; MG/1; MG/1
20 CAPSULE, EXTENDED RELEASE ORAL EVERY MORNING
Qty: 30 CAPSULE | Refills: 0 | Status: SHIPPED | OUTPATIENT
Start: 2024-06-10 | End: 2024-07-10

## 2024-05-09 RX ORDER — QUETIAPINE FUMARATE 50 MG/1
50 TABLET, FILM COATED ORAL NIGHTLY
Qty: 30 TABLET | Refills: 2 | Status: SHIPPED | OUTPATIENT
Start: 2024-05-09 | End: 2024-08-07

## 2024-05-09 NOTE — PROGRESS NOTES
"Outpatient Psychiatry      Subjective   Tete Underwood \"Sivan\", is a 44 y.o. female,  seen in follow-up.        Assessment/Plan   Problem List Items Addressed This Visit               ICD-10-CM     Bipolar 1 disorder, mixed, moderate (Multi) F31.62       Tolerating and benefiting from current regimen. No indication for medication changes today.      Mood sx continue still present significant disruption in the 7-10 days prior to menses. Encouraged to discuss this with GYN, as we have trialed increased dose of quetiapine and (in the past) lurasidone without significant benefit from either.      Continue quetiapine 50 mg PO at bedtime-refill sent to pharmacy today.   Continue bupropion  mg PO BID- refill sent to pharmacy today.   Continue individual therapy.            Relevant Medications     buPROPion SR (Wellbutrin SR) 100 mg 12 hr tablet     QUEtiapine (SEROquel) 50 mg tablet     Other Relevant Orders     Follow Up In Psychiatry     Adjustment disorder with anxious mood F43.22       Continue plan as noted above.   Rarely taking alprazolam PRN- has not taken since last visit. Reasonable to continue very low dose as needed for severe sx, often associated with psychosocial stressors.            Relevant Orders     Follow Up In Psychiatry     ADHD (attention deficit hyperactivity disorder), inattentive type F90.0       Tolerating and benefiting from current regimen. No indication for medication changes today.    Continue Adderall ER 20 mg PO daily- refills placed on file at pharmacy today.      Reviewed OARRS, no discrepancies or concerns. Discussed risk of insomnia, anxiety, agitation, anorexia, autonomic effects, toxicity, psychosis, dependence, tolerance, abuse.      Last UDS 2/26/2024- consistent with current rx, no discrepancies.            Relevant Medications     amphetamine-dextroamphetamine XR (Adderall XR) 20 mg 24 hr capsule (Start on 7/14/2024)     amphetamine-dextroamphetamine XR (Adderall XR) 20 mg " "24 hr capsule (Start on 8/11/2024)     Other Relevant Orders     Follow Up In Psychiatry      Other Visit Diagnoses           Codes     Attention disturbance     R41.840     Relevant Medications     buPROPion SR (Wellbutrin SR) 100 mg 12 hr tablet       Follow-up in 4-6 weeks.     Risk Assessment:  Risk Assessment: Tete Underwood is currently a low acute risk of suicide and self-harm due to no past suicide attempt(s) and not currently endorsing thoughts of suicide. Tete Underwood is currently a low acute risk of violence and harm to others due to no past history of violence and not currently threatening others.  Suicidal Risk Factors: , history of trauma/abuse, chronic medical illness, and current psychiatric illness  Violence Risk Factors: none  Protective Factors: strong coping skills, sense of responsibility towards family, social support/connectedness, moral objections to suicide, child related concerns/living with child <18 years, hopefulness/future orientation, and employment     Provided with crisis/emergency resources, including Mobile Crisis [phone], Crisis Text Line (tetl [id number] iq [id number]) and the National Suicide Prevention Lifeline hotline 1-565.992.6361. Agrees to call 988 or go to the nearest emergency department if she feels unsafe, or has suicidal thinking with a plan or intent.       Reason for Visit:  Follow-up for medication management.     HPI:  Since her last visit,     Doing pretty good.   There was a delay in   After ~1 week started to notice improvement in mood.   Felt less on-edge, less irritable, more \"even keel.\"  Sleeping well.   Energy level has been good.   No appetite changes, no changes in weight.     \"Feeling really good.\"  Seeing individual therapist every few weeks and that's going well.   Things with s/o are fine.   Still some stressors r/t daughter's father, but managing as best they can. Trying to be as supportive as possible for daughter.   Son's gf of 4 " years abruptly broke up with him, and he's having a hard time with it. He's graduating college next weekend.     Denies suicidal ideation or a passive death wish.     No new medications or health problems. Has lost ~40 lbs since starting Ozempic last October.       Current Psychiatric Medications:  Alprazolam 0.25 mg PO PRN for severe anxiety  Adderall ER 20 mg PO daily  Bupropion  mg PO BID  Quetiapine 50 mg PO QHS        Record Review: brief     Medical Review Of Systems:  Pertinent items are noted in HPI.    Psychiatric Review Of Systems:  As noted in HPI.        Objective   Mental Status Exam       Vitals:  There were no vitals filed for this visit.    Labs:  not applicable      Rachel Small, APRN-CNP, APRN-CNS

## 2024-05-10 ENCOUNTER — APPOINTMENT (OUTPATIENT)
Dept: ALLERGY | Facility: CLINIC | Age: 44
End: 2024-05-10
Payer: COMMERCIAL

## 2024-05-23 ENCOUNTER — SPECIALTY PHARMACY (OUTPATIENT)
Dept: PHARMACY | Facility: CLINIC | Age: 44
End: 2024-05-23

## 2024-05-23 PROCEDURE — RXMED WILLOW AMBULATORY MEDICATION CHARGE

## 2024-05-29 ENCOUNTER — PHARMACY VISIT (OUTPATIENT)
Dept: PHARMACY | Facility: CLINIC | Age: 44
End: 2024-05-29
Payer: COMMERCIAL

## 2024-06-19 ENCOUNTER — APPOINTMENT (OUTPATIENT)
Dept: BEHAVIORAL HEALTH | Facility: CLINIC | Age: 44
End: 2024-06-19
Payer: COMMERCIAL

## 2024-06-19 DIAGNOSIS — R41.840 ATTENTION DISTURBANCE: ICD-10-CM

## 2024-06-19 DIAGNOSIS — F90.0 ADHD (ATTENTION DEFICIT HYPERACTIVITY DISORDER), INATTENTIVE TYPE: ICD-10-CM

## 2024-06-19 DIAGNOSIS — F31.62 BIPOLAR 1 DISORDER, MIXED, MODERATE (MULTI): ICD-10-CM

## 2024-06-19 DIAGNOSIS — F43.22 ADJUSTMENT DISORDER WITH ANXIOUS MOOD: ICD-10-CM

## 2024-06-19 PROCEDURE — 1036F TOBACCO NON-USER: CPT | Performed by: CLINICAL NURSE SPECIALIST

## 2024-06-19 PROCEDURE — 99214 OFFICE O/P EST MOD 30 MIN: CPT | Performed by: CLINICAL NURSE SPECIALIST

## 2024-06-19 PROCEDURE — 3008F BODY MASS INDEX DOCD: CPT | Performed by: CLINICAL NURSE SPECIALIST

## 2024-06-19 RX ORDER — DEXTROAMPHETAMINE SACCHARATE, AMPHETAMINE ASPARTATE MONOHYDRATE, DEXTROAMPHETAMINE SULFATE AND AMPHETAMINE SULFATE 5; 5; 5; 5 MG/1; MG/1; MG/1; MG/1
20 CAPSULE, EXTENDED RELEASE ORAL EVERY MORNING
Qty: 30 CAPSULE | Refills: 0 | Status: SHIPPED | OUTPATIENT
Start: 2024-08-11 | End: 2024-09-10

## 2024-06-19 RX ORDER — DEXTROAMPHETAMINE SACCHARATE, AMPHETAMINE ASPARTATE MONOHYDRATE, DEXTROAMPHETAMINE SULFATE AND AMPHETAMINE SULFATE 5; 5; 5; 5 MG/1; MG/1; MG/1; MG/1
20 CAPSULE, EXTENDED RELEASE ORAL DAILY
Qty: 30 CAPSULE | Refills: 0 | Status: SHIPPED | OUTPATIENT
Start: 2024-07-14 | End: 2024-08-13

## 2024-06-19 RX ORDER — QUETIAPINE FUMARATE 50 MG/1
50 TABLET, FILM COATED ORAL NIGHTLY
Qty: 90 TABLET | Refills: 1 | Status: SHIPPED | OUTPATIENT
Start: 2024-06-19 | End: 2024-12-16

## 2024-06-19 RX ORDER — BUPROPION HYDROCHLORIDE 100 MG/1
100 TABLET, EXTENDED RELEASE ORAL 2 TIMES DAILY
Qty: 180 TABLET | Refills: 1 | Status: SHIPPED | OUTPATIENT
Start: 2024-06-19 | End: 2024-12-16

## 2024-06-19 NOTE — ASSESSMENT & PLAN NOTE
Tolerating and benefiting from current regimen. No indication for medication changes today.    Continue Adderall ER 20 mg PO daily- refills placed on file at pharmacy today.     Reviewed OARRS, no discrepancies or concerns. Discussed risk of insomnia, anxiety, agitation, anorexia, autonomic effects, toxicity, psychosis, dependence, tolerance, abuse.     Last UDS 2/26/2024- consistent with current rx, no discrepancies.

## 2024-06-19 NOTE — ASSESSMENT & PLAN NOTE
Tolerating and benefiting from current regimen. No indication for medication changes today.  Self-discontinued lurasidone ~2 months ago, and mood sx have remained stable with low dose quetiapine at bedtime. At this point, prefers to continue quetiapine rather than seek alternatives given that she is doing well at low-dose and metabolic side effects have been minimal.     Mood sx continue still present significant disruption in the 7-10 days prior to menses. Encouraged to discuss this with GYN, as we have trialed increased dose of quetiapine and (in the past) lurasidone without significant benefit from either.     Continue quetiapine 50 mg PO at bedtime-refill sent to pharmacy today.   Continue bupropion  mg PO BID- refill sent to pharmacy today.   Continue individual therapy.

## 2024-06-19 NOTE — PROGRESS NOTES
"Outpatient Psychiatry      Subjective   Tete Underwood \"Sivan\", is a 44 y.o. female,  seen in follow-up.      Assessment/Plan   Problem List Items Addressed This Visit             ICD-10-CM    Bipolar 1 disorder, mixed, moderate (Multi) F31.62     Tolerating and benefiting from current regimen. No indication for medication changes today.  Self-discontinued lurasidone ~2 months ago, and mood sx have remained stable with low dose quetiapine at bedtime. At this point, prefers to continue quetiapine rather than seek alternatives given that she is doing well at low-dose and metabolic side effects have been minimal.     Mood sx continue still present significant disruption in the 7-10 days prior to menses. Encouraged to discuss this with GYN, as we have trialed increased dose of quetiapine and (in the past) lurasidone without significant benefit from either.     Continue quetiapine 50 mg PO at bedtime-refill sent to pharmacy today.   Continue bupropion  mg PO BID- refill sent to pharmacy today.   Continue individual therapy.          Relevant Medications    buPROPion SR (Wellbutrin SR) 100 mg 12 hr tablet    QUEtiapine (SEROquel) 50 mg tablet    Other Relevant Orders    Follow Up In Psychiatry    Adjustment disorder with anxious mood F43.22     Continue plan as noted above.   Rarely taking alprazolam PRN- has not taken since last visit. Reasonable to continue very low dose as needed for severe sx, often associated with psychosocial stressors.          Relevant Orders    Follow Up In Psychiatry    ADHD (attention deficit hyperactivity disorder), inattentive type F90.0     Tolerating and benefiting from current regimen. No indication for medication changes today.    Continue Adderall ER 20 mg PO daily- refills placed on file at pharmacy today.     Reviewed OARRS, no discrepancies or concerns. Discussed risk of insomnia, anxiety, agitation, anorexia, autonomic effects, toxicity, psychosis, dependence, tolerance, " abuse.     Last UDS 2/26/2024- consistent with current rx, no discrepancies.          Relevant Medications    amphetamine-dextroamphetamine XR (Adderall XR) 20 mg 24 hr capsule (Start on 7/14/2024)    amphetamine-dextroamphetamine XR (Adderall XR) 20 mg 24 hr capsule (Start on 8/11/2024)    Other Relevant Orders    Follow Up In Psychiatry     Other Visit Diagnoses         Codes    Attention disturbance     R41.840    Relevant Medications    buPROPion SR (Wellbutrin SR) 100 mg 12 hr tablet            Follow-up in 6-8 weeks.     Risk Assessment:  Risk Assessment: Tete Underwood is currently a low acute risk of suicide and self-harm due to no past suicide attempt(s) and not currently endorsing thoughts of suicide. Tete Underwood is currently a low acute risk of violence and harm to others due to no past history of violence and not currently threatening others.  Suicidal Risk Factors: , history of trauma/abuse, chronic medical illness, and current psychiatric illness  Violence Risk Factors: none  Protective Factors: strong coping skills, sense of responsibility towards family, social support/connectedness, moral objections to suicide, child related concerns/living with child <18 years, hopefulness/future orientation, and employment     Provided with crisis/emergency resources, including Mobile Crisis [phone], Crisis Text Line (davt 3XAIF vz 540625) and the National Suicide Prevention Lifeline hotline 1-731.368.6489. Agrees to call 988 or go to the nearest emergency department if she feels unsafe, or has suicidal thinking with a plan or intent.     Reason for Visit:  Follow-up for medication management.     HPI:  Since her last visit,     Having a hard time with daughter.   Gave her back her phone with some rules that she didn't follow, so took phone away again.   Very angry about it, and has been giving her and dad a very hard time.   Therapist is out on a medical leave and recently canceled upcoming  "appt. Hasn't seen anyone in ~6 weeks, so trying to find out whether she can see someone else in the office.   Dad isn't enforcing the same limits, which makes it even more challenging for her.     Found out there are some layoffs coming in her department at work. So far not affecting her, but still worries about uncertainty.     Getting sleep every night, but not as well. Thinks r/t stress. Finds herself lying in bed thinking about everything that's going on, trying to figure out what else she can do, etc. Overslept one day recently and was running late for work.   Feeling more supported by partner, he has been in a better mental space   Meeting with therapist weekly, and that's really helpful. Therapist also works with teens and has been able to give her perspective as things have been more challenging with her daughter.     Hasn't been taking alprazolam PRN, but there have been times that she's considered it with the anxiety she's been experiencing recently. Knows it's r/t stressors and that it will improve over time, but in the interim trying to manage the best she can.     Otherwise, no concerns with medications.     Denies suicidal ideation or a passive death wish.     No new medications or health problems.       Current Psychiatric Medications:  Alprazolam 0.25 mg PO PRN for severe anxiety/panic- managed by PCP (rarely taking)  Bupropion  mg PO BID  Quetiapine 50 mg PO at bedtime  Adderall XR 20 mg PO daily      Record Review: brief     Medical Review Of Systems:  Pertinent items are noted in HPI.    Psychiatric Review Of Systems:  As noted in HPI.        Objective   Mental Status Exam  General Appearance: Well groomed, appropriate eye contact  Attitude/Behavior: Cooperative  Motor: No psychomotor agitation or retardation, no tremor or other abnormal movements  Speech: Normal rate, volume, prosody  Gait/Station: Other:(comment) (not observed- virtual)  Mood: \"Just bubba stressed.\"  Affect: Euthymic, " full-range, Congruent with mood and topic of conversation  Thought Process: Linear, goal directed  Thought Associations: No loosening of associations  Thought Content: Normal, Other: (comment) (some anxious themes r/t dynamics with daughter, work-related stressors.)  Perception: No perceptual abnormalities noted  Sensorium: Alert and oriented to person, place, time and situation  Insight: Intact  Judgement: Intact  Cognition: Cognitively intact to conversational testing with respect to attention, orientation, fund of knowledge, recent and remote memory, and language    Vitals:  There were no vitals filed for this visit.    Labs:  not applicable        Rachel mSall, APRN-CNP, APRN-CNS

## 2024-06-19 NOTE — ASSESSMENT & PLAN NOTE
Continue plan as noted above.   Rarely taking alprazolam PRN- has not taken since last visit. Reasonable to continue very low dose as needed for severe sx, often associated with psychosocial stressors.

## 2024-06-24 ENCOUNTER — SPECIALTY PHARMACY (OUTPATIENT)
Dept: PHARMACY | Facility: CLINIC | Age: 44
End: 2024-06-24

## 2024-06-24 PROCEDURE — RXMED WILLOW AMBULATORY MEDICATION CHARGE

## 2024-06-27 ENCOUNTER — PHARMACY VISIT (OUTPATIENT)
Dept: PHARMACY | Facility: CLINIC | Age: 44
End: 2024-06-27
Payer: COMMERCIAL

## 2024-07-22 ENCOUNTER — SPECIALTY PHARMACY (OUTPATIENT)
Dept: PHARMACY | Facility: CLINIC | Age: 44
End: 2024-07-22

## 2024-07-22 PROCEDURE — RXMED WILLOW AMBULATORY MEDICATION CHARGE

## 2024-07-24 DIAGNOSIS — F90.0 ADHD (ATTENTION DEFICIT HYPERACTIVITY DISORDER), INATTENTIVE TYPE: ICD-10-CM

## 2024-07-24 RX ORDER — DEXTROAMPHETAMINE SACCHARATE, AMPHETAMINE ASPARTATE MONOHYDRATE, DEXTROAMPHETAMINE SULFATE AND AMPHETAMINE SULFATE 5; 5; 5; 5 MG/1; MG/1; MG/1; MG/1
20 CAPSULE, EXTENDED RELEASE ORAL DAILY
Qty: 30 CAPSULE | Refills: 0 | Status: SHIPPED | OUTPATIENT
Start: 2024-07-24 | End: 2024-08-23

## 2024-07-25 ENCOUNTER — PHARMACY VISIT (OUTPATIENT)
Dept: PHARMACY | Facility: CLINIC | Age: 44
End: 2024-07-25
Payer: COMMERCIAL

## 2024-08-02 ENCOUNTER — TELEPHONE (OUTPATIENT)
Dept: PRIMARY CARE | Facility: CLINIC | Age: 44
End: 2024-08-02
Payer: COMMERCIAL

## 2024-08-19 ENCOUNTER — SPECIALTY PHARMACY (OUTPATIENT)
Dept: PHARMACY | Facility: CLINIC | Age: 44
End: 2024-08-19

## 2024-08-19 PROCEDURE — RXMED WILLOW AMBULATORY MEDICATION CHARGE

## 2024-08-20 ENCOUNTER — PHARMACY VISIT (OUTPATIENT)
Dept: PHARMACY | Facility: CLINIC | Age: 44
End: 2024-08-20
Payer: COMMERCIAL

## 2024-08-21 RX ORDER — BENZONATATE 200 MG/1
CAPSULE ORAL
COMMUNITY
Start: 2024-07-21

## 2024-08-22 ENCOUNTER — APPOINTMENT (OUTPATIENT)
Dept: BEHAVIORAL HEALTH | Facility: CLINIC | Age: 44
End: 2024-08-22
Payer: COMMERCIAL

## 2024-08-22 DIAGNOSIS — F43.22 ADJUSTMENT DISORDER WITH ANXIOUS MOOD: ICD-10-CM

## 2024-08-22 DIAGNOSIS — F31.62 BIPOLAR 1 DISORDER, MIXED, MODERATE (MULTI): ICD-10-CM

## 2024-08-22 DIAGNOSIS — F90.0 ADHD (ATTENTION DEFICIT HYPERACTIVITY DISORDER), INATTENTIVE TYPE: ICD-10-CM

## 2024-08-22 PROCEDURE — 99213 OFFICE O/P EST LOW 20 MIN: CPT | Performed by: CLINICAL NURSE SPECIALIST

## 2024-08-22 PROCEDURE — 1036F TOBACCO NON-USER: CPT | Performed by: CLINICAL NURSE SPECIALIST

## 2024-08-22 RX ORDER — DEXTROAMPHETAMINE SACCHARATE, AMPHETAMINE ASPARTATE MONOHYDRATE, DEXTROAMPHETAMINE SULFATE AND AMPHETAMINE SULFATE 5; 5; 5; 5 MG/1; MG/1; MG/1; MG/1
20 CAPSULE, EXTENDED RELEASE ORAL DAILY
Qty: 30 CAPSULE | Refills: 0 | Status: SHIPPED | OUTPATIENT
Start: 2024-08-22 | End: 2024-09-21

## 2024-08-22 RX ORDER — DEXTROAMPHETAMINE SACCHARATE, AMPHETAMINE ASPARTATE MONOHYDRATE, DEXTROAMPHETAMINE SULFATE AND AMPHETAMINE SULFATE 5; 5; 5; 5 MG/1; MG/1; MG/1; MG/1
20 CAPSULE, EXTENDED RELEASE ORAL DAILY
Qty: 30 CAPSULE | Refills: 0 | Status: SHIPPED | OUTPATIENT
Start: 2024-10-17 | End: 2024-11-16

## 2024-08-22 RX ORDER — DEXTROAMPHETAMINE SACCHARATE, AMPHETAMINE ASPARTATE MONOHYDRATE, DEXTROAMPHETAMINE SULFATE AND AMPHETAMINE SULFATE 5; 5; 5; 5 MG/1; MG/1; MG/1; MG/1
20 CAPSULE, EXTENDED RELEASE ORAL DAILY
Qty: 30 CAPSULE | Refills: 0 | Status: SHIPPED | OUTPATIENT
Start: 2024-09-19 | End: 2024-10-19

## 2024-08-22 NOTE — ASSESSMENT & PLAN NOTE
Tolerating and benefiting from current regimen. No indication for medication changes today.      Mood sx continue still present significant disruption in the 7-10 days prior to menses. We have trialed increased dose of quetiapine and (in the past) lurasidone without significant benefit from either. Has discussed with GYN, but not a candidate for HRT d/t history of blood clots. Scheduled with dietician who specializes in women's health.     Continue quetiapine 50 mg PO at bedtime- no refill needed today.    Continue bupropion  mg PO BID- no refill needed today.    Continue individual therapy.

## 2024-08-22 NOTE — ASSESSMENT & PLAN NOTE
Tolerating and benefiting from current regimen. No indication for medication changes today.      Continue Adderall ER 20 mg PO daily- refills placed on file at [facility]'s pharmacy today (med out of stock at [facility]).    Reviewed OARRS, no discrepancies or concerns. Discussed risk of insomnia, anxiety, agitation, anorexia, autonomic effects, toxicity, psychosis, dependence, tolerance, abuse.     Last UDS 2/26/2024- consistent with current rx, no discrepancies.

## 2024-08-22 NOTE — PROGRESS NOTES
"Outpatient Psychiatry      Subjective   Tete Underwood \"Sivan\", is a 44 y.o. female,  seen in follow-up.      Assessment/Plan   Problem List Items Addressed This Visit             ICD-10-CM    Bipolar 1 disorder, mixed, moderate (Multi) F31.62     Tolerating and benefiting from current regimen. No indication for medication changes today.      Mood sx continue still present significant disruption in the 7-10 days prior to menses. We have trialed increased dose of quetiapine and (in the past) lurasidone without significant benefit from either. Has discussed with GYN, but not a candidate for HRT d/t history of blood clots. Scheduled with dietician who specializes in women's health.     Continue quetiapine 50 mg PO at bedtime- no refill needed today.    Continue bupropion  mg PO BID- no refill needed today.    Continue individual therapy.          Relevant Orders    Follow Up In Psychiatry    Adjustment disorder with anxious mood F43.22     Continue plan as noted above.   Rarely taking alprazolam PRN- has not taken since last visit. Reasonable to continue very low dose as needed for severe sx, often associated with psychosocial stressors. No refill needed today.          Relevant Orders    Follow Up In Psychiatry    ADHD (attention deficit hyperactivity disorder), inattentive type F90.0     Tolerating and benefiting from current regimen. No indication for medication changes today.      Continue Adderall ER 20 mg PO daily- refills placed on file at [facility]'s pharmacy today (med out of stock at [facility]).    Reviewed OARRS, no discrepancies or concerns. Discussed risk of insomnia, anxiety, agitation, anorexia, autonomic effects, toxicity, psychosis, dependence, tolerance, abuse.     Last UDS 2/26/2024- consistent with current rx, no discrepancies.          Relevant Medications    amphetamine-dextroamphetamine XR (Adderall XR) 20 mg 24 hr capsule    amphetamine-dextroamphetamine XR (Adderall XR) 20 mg 24 hr capsule " "(Start on 9/19/2024)    amphetamine-dextroamphetamine XR (Adderall XR) 20 mg 24 hr capsule (Start on 10/17/2024)    Other Relevant Orders    Follow Up In Psychiatry       Follow-up in 10-12 weeks.     Risk Assessment:  Risk Assessment: Tete Underwood is currently a low acute risk of suicide and self-harm due to no past suicide attempt(s) and not currently endorsing thoughts of suicide. Tete Underwood is currently a low acute risk of violence and harm to others due to no past history of violence and not currently threatening others.  Suicidal Risk Factors: , history of trauma/abuse, chronic medical illness, and current psychiatric illness  Violence Risk Factors: none  Protective Factors: strong coping skills, sense of responsibility towards family, social support/connectedness, moral objections to suicide, child related concerns/living with child <18 years, hopefulness/future orientation, and employment     Provided with crisis/emergency resources, including Mobile Crisis [phone], Crisis Text Line (tdnj 5NTCV um 037825) and the National Suicide Prevention Lifeline hotline 1-863.352.6213. Agrees to call 988 or go to the nearest emergency department if she feels unsafe, or has suicidal thinking with a plan or intent.       Reason for Visit:  Follow-up for medication management.     HPI:  Since her last visit,     Things are going ok.   S/o lost his job on Monday.   Friend owns a business and he's planning to do some work for him while he looks for another job.  She has also talked with her employer about picking up extra shifts as well.     Mood has been, \"pretty good.\"   Still experiencing disruptive mood swings before her period.   Discussed with GYN, but there aren't many options since she's not a candidate for HRT.     Has been taking Ozempic for ~1 year and has lost 45 lbs.   Unsure whether she'll be able to remain on the medication long term, so recently referred to a dietician who also has " "expertise specifically with women's health.   Hopeful that perhaps some changes in diet and nutritional supplements could have a positive impact on PMDD sx.     No concerns with current medications, continues to report benefit.     Denies suicidal ideation or a passive death wish.     No new medications or health problems.       Current Psychiatric Medications:  Alprazolam 0.25 mg PO PRN for severe anxiety/panic- managed by PCP (rarely taking)  Bupropion  mg PO BID  Quetiapine 50 mg PO at bedtime  Adderall XR 20 mg PO daily      Record Review: brief     Medical Review Of Systems:  Pertinent items are noted in HPI.    Psychiatric Review Of Systems:  As noted in HPI.        Objective   Mental Status Exam  General Appearance: Well groomed, appropriate eye contact  Attitude/Behavior: Cooperative  Motor: No psychomotor agitation or retardation, no tremor or other abnormal movements  Speech: Normal rate, volume, prosody  Gait/Station: Other:(comment) (not observed- virtual)  Mood: \"Doing ok.\"  Affect: Congruent with mood and topic of conversation  Thought Process: Linear, goal directed  Thought Associations: No loosening of associations  Thought Content: Other: (comment) (some anxious themes r/t partner's recent job loss.)  Perception: No perceptual abnormalities noted  Sensorium: Alert and oriented to person, place, time and situation  Insight: Intact  Judgement: Intact  Cognition: Cognitively intact to conversational testing with respect to attention, orientation, fund of knowledge, recent and remote memory, and language    Vitals:  There were no vitals filed for this visit.    Labs:  not applicable      Rachel Small, APRN-CNP, APRN-CNS  "

## 2024-08-22 NOTE — ASSESSMENT & PLAN NOTE
Continue plan as noted above.   Rarely taking alprazolam PRN- has not taken since last visit. Reasonable to continue very low dose as needed for severe sx, often associated with psychosocial stressors. No refill needed today.

## 2024-09-03 ENCOUNTER — OFFICE VISIT (OUTPATIENT)
Dept: PRIMARY CARE | Facility: CLINIC | Age: 44
End: 2024-09-03
Payer: COMMERCIAL

## 2024-09-03 ENCOUNTER — APPOINTMENT (OUTPATIENT)
Dept: PRIMARY CARE | Facility: CLINIC | Age: 44
End: 2024-09-03
Payer: COMMERCIAL

## 2024-09-03 VITALS
SYSTOLIC BLOOD PRESSURE: 118 MMHG | HEIGHT: 62 IN | BODY MASS INDEX: 29.26 KG/M2 | DIASTOLIC BLOOD PRESSURE: 80 MMHG | WEIGHT: 159 LBS | HEART RATE: 82 BPM

## 2024-09-03 DIAGNOSIS — Z76.89 ENCOUNTER FOR WEIGHT MANAGEMENT: ICD-10-CM

## 2024-09-03 PROCEDURE — 3074F SYST BP LT 130 MM HG: CPT | Performed by: FAMILY MEDICINE

## 2024-09-03 PROCEDURE — 1036F TOBACCO NON-USER: CPT | Performed by: FAMILY MEDICINE

## 2024-09-03 PROCEDURE — 99213 OFFICE O/P EST LOW 20 MIN: CPT | Performed by: FAMILY MEDICINE

## 2024-09-03 PROCEDURE — 3079F DIAST BP 80-89 MM HG: CPT | Performed by: FAMILY MEDICINE

## 2024-09-03 PROCEDURE — 3008F BODY MASS INDEX DOCD: CPT | Performed by: FAMILY MEDICINE

## 2024-09-03 RX ORDER — CETIRIZINE HYDROCHLORIDE 5 MG/1
5 TABLET ORAL DAILY
COMMUNITY

## 2024-09-03 ASSESSMENT — ENCOUNTER SYMPTOMS
HEADACHES: 0
FEVER: 0
SHORTNESS OF BREATH: 0
FATIGUE: 0
ACTIVITY CHANGE: 0
DIZZINESS: 0

## 2024-09-03 ASSESSMENT — PATIENT HEALTH QUESTIONNAIRE - PHQ9
SUM OF ALL RESPONSES TO PHQ9 QUESTIONS 1 AND 2: 0
2. FEELING DOWN, DEPRESSED OR HOPELESS: NOT AT ALL
1. LITTLE INTEREST OR PLEASURE IN DOING THINGS: NOT AT ALL

## 2024-09-03 NOTE — PROGRESS NOTES
"Subjective   Patient ID: Sivan Underwood is a 44 y.o. female who presents for Follow-up (Weight loss).    Weight management   - no side effects or issues with the medication   - no nausea, no constipation   - reports she has been working on some exercise, tries to stay active   - working with a nutritionist and has been getting some good advice on diet          Review of Systems   Constitutional:  Negative for activity change, fatigue and fever.   Respiratory:  Negative for shortness of breath.    Cardiovascular:  Negative for chest pain.   Neurological:  Negative for dizziness and headaches.       Objective   /80   Pulse 82   Ht 1.575 m (5' 2\")   Wt 72.1 kg (159 lb)   BMI 29.08 kg/m²     Physical Exam  Constitutional:       Appearance: Normal appearance. She is obese.   Neurological:      Mental Status: She is alert.   Psychiatric:         Mood and Affect: Mood normal.         Behavior: Behavior normal.         Thought Content: Thought content normal.         Assessment/Plan   Problem List Items Addressed This Visit    None  Visit Diagnoses         Codes    BMI 29.0-29.9,adult    -  Primary Z68.29    stable   - refilled semaglutide   - f/u 3 months     Encounter for weight management     Z76.89               "

## 2024-09-13 ENCOUNTER — SPECIALTY PHARMACY (OUTPATIENT)
Dept: PHARMACY | Facility: CLINIC | Age: 44
End: 2024-09-13

## 2024-09-24 ENCOUNTER — TELEMEDICINE (OUTPATIENT)
Dept: BEHAVIORAL HEALTH | Facility: CLINIC | Age: 44
End: 2024-09-24
Payer: COMMERCIAL

## 2024-09-24 DIAGNOSIS — F32.81 PMDD (PREMENSTRUAL DYSPHORIC DISORDER): ICD-10-CM

## 2024-09-24 DIAGNOSIS — F31.62 BIPOLAR 1 DISORDER, MIXED, MODERATE (MULTI): ICD-10-CM

## 2024-09-24 PROCEDURE — 99214 OFFICE O/P EST MOD 30 MIN: CPT | Performed by: CLINICAL NURSE SPECIALIST

## 2024-09-24 RX ORDER — FLUOXETINE 10 MG/1
10 CAPSULE ORAL DAILY
Qty: 14 CAPSULE | Refills: 1 | Status: SHIPPED | OUTPATIENT
Start: 2024-09-24

## 2024-09-24 NOTE — PROGRESS NOTES
"Outpatient Psychiatry      Subjective   Tete Underwood \"Sivan\", is a 44 y.o. female,  seen in follow-up.      Assessment/Plan   Problem List Items Addressed This Visit             ICD-10-CM    Bipolar 1 disorder, mixed, moderate (Multi) F31.62     As noted below, starting low-dose fluoxetine, intermittently, for tx of PMDD sx. Discussed risks/benefits/alternatives, including specific risks associated with use of SSRI in individuals with a history of bipolar disorder, and importance of continuing tx with mood stabilizing medication. She is consistently adherent to prescribed medications, including quetiapine.     Continue quetiapine 50 mg PO at bedtime- no refill needed today.    Continue bupropion  mg PO BID- no refill needed today.    Continue individual therapy.          PMDD (premenstrual dysphoric disorder) F32.81     Mood sx continue still present significant disruption in the 7-10 days prior to menses. We have trialed increased dose of quetiapine and (in the past) lurasidone without significant benefit from either. Has discussed with GYN, but not a candidate for HRT d/t history of blood clots     Limited research/clinical guidelines for tx of PMDD in individuals with bipolar disorder. General approach is tx (intermittent or continuous) with low-dose SSRI or SNRI. She is currently prescribed bupropion, but evidence for its use in tx of  PMDD is very limited. May be worth considering in the future. Discussed cautiously trialing tx with low-dose fluoxetine intermittently for sx, based on best available evidence. Discussed indication(s), reviewed risks/benefits/alternatives. Reviewed low risk for \"manic switch\" in individuals with bipolar disorder receiving tx with an antidepressant. She will continue routine tx with mood stabilizing medication, which may mitigate this (low) risk further.     Start fluoxetine 10 mg PO daily x 2 weeks per month. Begin 1 week prior to usual sx onset.         Relevant " Medications    FLUoxetine (PROzac) 10 mg capsule       Follow-up in 6-8 weeks.     Risk Assessment:  Risk Assessment: Tete Underwood is currently a low acute risk of suicide and self-harm due to no past suicide attempt(s) and not currently endorsing thoughts of suicide. Tete Underwood is currently a low acute risk of violence and harm to others due to no past history of violence and not currently threatening others.  Suicidal Risk Factors: , history of trauma/abuse, chronic medical illness, and current psychiatric illness  Violence Risk Factors: none  Protective Factors: strong coping skills, sense of responsibility towards family, social support/connectedness, moral objections to suicide, child related concerns/living with child <18 years, hopefulness/future orientation, and employment     Provided with crisis/emergency resources, including Mobile Crisis [phone], Crisis Text Line (text 4DNBV to 149122) and the National Suicide Prevention Lifeline hotline 1-605.818.5017. Agrees to call 988 or go to the nearest emergency department if she feels unsafe, or has suicidal thinking with a plan or intent.       Reason for Visit:  Follow-up for medication management.     HPI:  Since her last visit,     Reached out via My Chart- concerned re: mood lability and irritability, possibly reflecting worsening PMDD sx.   Usually sx worsen prior to menses and continue through the week of her period.  Very easily irritated, out of proportion to the situation in retrospect, significant other and daughter have noticed the difference.   Seems to be worsening for the past few months, and concerned about sx starting to impact interactions at work as well.  Has discussed with GYN, but not a candidate for hormone therapies d/t history of blood clots, and not many other options available.    Has not benefited from efforts to intermittently increase dose of mood stabilizer in the past.     Throughout the rest of the month,  "mood sx have been stable, and otherwise well-controlled.     Denies suicidal ideation or a passive death wish.     No new medications or health problems.       Current Psychiatric Medications:  Alprazolam 0.25 mg PO PRN for severe anxiety/panic- managed by PCP (rarely taking)  Bupropion  mg PO BID  Quetiapine 50 mg PO at bedtime  Adderall XR 20 mg PO daily      Record Review: brief     Medical Review Of Systems:  Pertinent items are noted in HPI.    Psychiatric Review Of Systems:  As noted in HPI.        Objective   Mental Status Exam  General Appearance: Well groomed, appropriate eye contact  Attitude/Behavior: Cooperative  Motor: No psychomotor agitation or retardation, no tremor or other abnormal movements  Speech: Normal rate, volume, prosody  Gait/Station: Other:(comment) (not observed- virtual)  Mood: \"I'm just concerned...\"  Affect: Euthymic, full-range, Congruent with mood and topic of conversation  Thought Process: Linear, goal directed  Thought Associations: No loosening of associations  Thought Content: Other: (comment) (concern re: impact of PMDD sx on relationships and potentially work performance.)  Perception: No perceptual abnormalities noted  Sensorium: Alert and oriented to person, place, time and situation  Insight: Intact  Judgement: Intact  Cognition: Cognitively intact to conversational testing with respect to attention, orientation, fund of knowledge, recent and remote memory, and language    Vitals:  There were no vitals filed for this visit.    Labs:  not applicable      Rachel Small, APRN-CNP, APRN-CNS  "

## 2024-09-26 DIAGNOSIS — F90.0 ADHD (ATTENTION DEFICIT HYPERACTIVITY DISORDER), INATTENTIVE TYPE: ICD-10-CM

## 2024-09-26 RX ORDER — DEXTROAMPHETAMINE SACCHARATE, AMPHETAMINE ASPARTATE MONOHYDRATE, DEXTROAMPHETAMINE SULFATE AND AMPHETAMINE SULFATE 5; 5; 5; 5 MG/1; MG/1; MG/1; MG/1
20 CAPSULE, EXTENDED RELEASE ORAL DAILY
Qty: 30 CAPSULE | Refills: 0 | Status: SHIPPED | OUTPATIENT
Start: 2024-09-26 | End: 2024-10-26

## 2024-09-27 ENCOUNTER — PHARMACY VISIT (OUTPATIENT)
Dept: PHARMACY | Facility: CLINIC | Age: 44
End: 2024-09-27
Payer: COMMERCIAL

## 2024-09-27 PROBLEM — F32.81 PMDD (PREMENSTRUAL DYSPHORIC DISORDER): Status: ACTIVE | Noted: 2024-09-27

## 2024-09-27 PROCEDURE — RXMED WILLOW AMBULATORY MEDICATION CHARGE

## 2024-09-27 NOTE — ASSESSMENT & PLAN NOTE
As noted below, starting low-dose fluoxetine, intermittently, for tx of PMDD sx. Discussed risks/benefits/alternatives, including specific risks associated with use of SSRI in individuals with a history of bipolar disorder, and importance of continuing tx with mood stabilizing medication. She is consistently adherent to prescribed medications, including quetiapine.     Continue quetiapine 50 mg PO at bedtime- no refill needed today.    Continue bupropion  mg PO BID- no refill needed today.    Continue individual therapy.

## 2024-09-27 NOTE — ASSESSMENT & PLAN NOTE
"Mood sx continue still present significant disruption in the 7-10 days prior to menses. We have trialed increased dose of quetiapine and (in the past) lurasidone without significant benefit from either. Has discussed with GYN, but not a candidate for HRT d/t history of blood clots     Limited research/clinical guidelines for tx of PMDD in individuals with bipolar disorder. General approach is tx (intermittent or continuous) with low-dose SSRI or SNRI. She is currently prescribed bupropion, but evidence for its use in tx of  PMDD is very limited. May be worth considering in the future. Discussed cautiously trialing tx with low-dose fluoxetine intermittently for sx, based on best available evidence. Discussed indication(s), reviewed risks/benefits/alternatives. Reviewed low risk for \"manic switch\" in individuals with bipolar disorder receiving tx with an antidepressant. She will continue routine tx with mood stabilizing medication, which may mitigate this (low) risk further.     Start fluoxetine 10 mg PO daily x 2 weeks per month. Begin 1 week prior to usual sx onset.  "

## 2024-10-18 ENCOUNTER — PHARMACY VISIT (OUTPATIENT)
Dept: PHARMACY | Facility: CLINIC | Age: 44
End: 2024-10-18
Payer: COMMERCIAL

## 2024-10-18 ENCOUNTER — SPECIALTY PHARMACY (OUTPATIENT)
Dept: PHARMACY | Facility: CLINIC | Age: 44
End: 2024-10-18

## 2024-10-18 PROCEDURE — RXMED WILLOW AMBULATORY MEDICATION CHARGE

## 2024-10-24 DIAGNOSIS — F90.0 ADHD (ATTENTION DEFICIT HYPERACTIVITY DISORDER), INATTENTIVE TYPE: ICD-10-CM

## 2024-10-24 RX ORDER — DEXTROAMPHETAMINE SACCHARATE, AMPHETAMINE ASPARTATE MONOHYDRATE, DEXTROAMPHETAMINE SULFATE AND AMPHETAMINE SULFATE 5; 5; 5; 5 MG/1; MG/1; MG/1; MG/1
20 CAPSULE, EXTENDED RELEASE ORAL DAILY
Qty: 30 CAPSULE | Refills: 0 | Status: SHIPPED | OUTPATIENT
Start: 2024-10-24 | End: 2024-11-23

## 2024-10-25 ENCOUNTER — PATIENT MESSAGE (OUTPATIENT)
Dept: PRIMARY CARE | Facility: CLINIC | Age: 44
End: 2024-10-25
Payer: COMMERCIAL

## 2024-10-25 DIAGNOSIS — E66.9 OBESITY (BMI 30-39.9): ICD-10-CM

## 2024-11-04 ENCOUNTER — TELEMEDICINE (OUTPATIENT)
Dept: BEHAVIORAL HEALTH | Facility: CLINIC | Age: 44
End: 2024-11-04
Payer: COMMERCIAL

## 2024-11-04 DIAGNOSIS — F32.81 PMDD (PREMENSTRUAL DYSPHORIC DISORDER): ICD-10-CM

## 2024-11-04 DIAGNOSIS — F31.62 BIPOLAR 1 DISORDER, MIXED, MODERATE (MULTI): ICD-10-CM

## 2024-11-04 DIAGNOSIS — F43.22 ADJUSTMENT DISORDER WITH ANXIOUS MOOD: ICD-10-CM

## 2024-11-04 DIAGNOSIS — F90.0 ADHD (ATTENTION DEFICIT HYPERACTIVITY DISORDER), INATTENTIVE TYPE: ICD-10-CM

## 2024-11-04 PROCEDURE — 99214 OFFICE O/P EST MOD 30 MIN: CPT | Performed by: CLINICAL NURSE SPECIALIST

## 2024-11-04 PROCEDURE — 1036F TOBACCO NON-USER: CPT | Performed by: CLINICAL NURSE SPECIALIST

## 2024-11-04 RX ORDER — FLUOXETINE 10 MG/1
10 CAPSULE ORAL DAILY
Qty: 14 CAPSULE | Refills: 1 | Status: SHIPPED | OUTPATIENT
Start: 2024-11-04

## 2024-11-04 RX ORDER — DEXTROAMPHETAMINE SACCHARATE, AMPHETAMINE ASPARTATE MONOHYDRATE, DEXTROAMPHETAMINE SULFATE AND AMPHETAMINE SULFATE 5; 5; 5; 5 MG/1; MG/1; MG/1; MG/1
20 CAPSULE, EXTENDED RELEASE ORAL DAILY
Qty: 30 CAPSULE | Refills: 0 | Status: SHIPPED | OUTPATIENT
Start: 2024-11-21 | End: 2024-12-21

## 2024-11-04 RX ORDER — DEXTROAMPHETAMINE SACCHARATE, AMPHETAMINE ASPARTATE MONOHYDRATE, DEXTROAMPHETAMINE SULFATE AND AMPHETAMINE SULFATE 5; 5; 5; 5 MG/1; MG/1; MG/1; MG/1
20 CAPSULE, EXTENDED RELEASE ORAL DAILY
Qty: 30 CAPSULE | Refills: 0 | Status: SHIPPED | OUTPATIENT
Start: 2024-12-19 | End: 2025-01-18

## 2024-11-04 RX ORDER — FLUOXETINE 10 MG/1
10 CAPSULE ORAL DAILY
Qty: 30 CAPSULE | Refills: 2 | Status: SHIPPED | OUTPATIENT
Start: 2024-11-04 | End: 2025-02-02

## 2024-11-04 NOTE — ASSESSMENT & PLAN NOTE
"Tolerating low-dose fluoxetine as intermittent therapy for PMDD sx with partial benefit. Notes noticeable improvement in mood and anxious sx during the time she takes the medication, with some persisting, residual sx. Would like to consider routine tx with low-dose fluoxetine given benefits she's experienced from intermittent tx so far, and also wondering about potential increase in dose given some persistent PMDD sx during intermittent tx.     As noted in previous visit, limited research/clinical guidelines for tx of PMDD in individuals with bipolar disorder. General approach is tx (intermittent or continuous) with low-dose SSRI or SNRI. She is currently prescribed bupropion, but evidence for its use in tx of  PMDD is very limited. May be worth considering in the future. Discussed cautiously trialing routine tx with low-dose fluoxetine for mood sx overall, with intermittent dose increase for PMDD sx, based on best available evidence. Discussed indication(s), reviewed risks/benefits/alternatives. Reviewed low risk for \"manic switch\" in individuals with bipolar disorder receiving tx with an antidepressant. She will continue routine tx with mood stabilizing medication, which may mitigate this (low) risk further.     Start fluoxetine 10 mg PO daily, and increase to 20 mg PO daily x 2 weeks per month. Begin 1 week prior to usual sx onset.  "

## 2024-11-04 NOTE — PROGRESS NOTES
"Outpatient Psychiatry      Subjective   Tete Evanso \"Sivan\", is a 44 y.o. female,  seen in follow-up.        Assessment/Plan   Problem List Items Addressed This Visit             ICD-10-CM    Bipolar 1 disorder, mixed, moderate (Multi) F31.62     As noted below, starting low-dose fluoxetine routinely for mood and anxious sx, with intermittent titration for tx of PMDD sx. Discussed risks/benefits/alternatives, including specific risks associated with use of SSRI in individuals with a history of bipolar disorder, and importance of continuing tx with mood stabilizing medication. She is consistently adherent to prescribed medications, including quetiapine. Will otherwise continue medications without dose changes today.     Start fluoxetine 10 mg PO daily as noted above, with titration to 20 mg x 2 weeks per month for PMDD sx.   Continue quetiapine 50 mg PO at bedtime- no refill needed today.    Continue bupropion  mg PO BID- no refill needed today.    Continue individual therapy.          Relevant Medications    FLUoxetine (PROzac) 10 mg capsule    Other Relevant Orders    Follow Up In Psychiatry    Adjustment disorder with anxious mood F43.22     Continue plan as noted above.   Rarely taking alprazolam PRN- has not taken since last visit. Reasonable to continue very low dose as needed for severe sx, often associated with psychosocial stressors. No refill needed today.          Relevant Medications    FLUoxetine (PROzac) 10 mg capsule    Other Relevant Orders    Follow Up In Psychiatry    ADHD (attention deficit hyperactivity disorder), inattentive type F90.0     Tolerating and benefiting from current regimen. No indication for medication changes today.      Continue Adderall ER 20 mg PO daily- refills placed on file at [facility] pharmacy today.    Reviewed OARRS, no discrepancies or concerns. Discussed risk of insomnia, anxiety, agitation, anorexia, autonomic effects, toxicity, psychosis, dependence, tolerance, " "abuse.     Last UDS 2/26/2024- consistent with current rx, no discrepancies.          Relevant Medications    amphetamine-dextroamphetamine XR (Adderall XR) 20 mg 24 hr capsule (Start on 11/21/2024)    amphetamine-dextroamphetamine XR (Adderall XR) 20 mg 24 hr capsule (Start on 12/19/2024)    Other Relevant Orders    Follow Up In Psychiatry    PMDD (premenstrual dysphoric disorder) F32.81     Tolerating low-dose fluoxetine as intermittent therapy for PMDD sx with partial benefit. Notes noticeable improvement in mood and anxious sx during the time she takes the medication, with some persisting, residual sx. Would like to consider routine tx with low-dose fluoxetine given benefits she's experienced from intermittent tx so far, and also wondering about potential increase in dose given some persistent PMDD sx during intermittent tx.     As noted in previous visit, limited research/clinical guidelines for tx of PMDD in individuals with bipolar disorder. General approach is tx (intermittent or continuous) with low-dose SSRI or SNRI. She is currently prescribed bupropion, but evidence for its use in tx of  PMDD is very limited. May be worth considering in the future. Discussed cautiously trialing routine tx with low-dose fluoxetine for mood sx overall, with intermittent dose increase for PMDD sx, based on best available evidence. Discussed indication(s), reviewed risks/benefits/alternatives. Reviewed low risk for \"manic switch\" in individuals with bipolar disorder receiving tx with an antidepressant. She will continue routine tx with mood stabilizing medication, which may mitigate this (low) risk further.     Start fluoxetine 10 mg PO daily, and increase to 20 mg PO daily x 2 weeks per month. Begin 1 week prior to usual sx onset.         Relevant Medications    FLUoxetine (PROzac) 10 mg capsule       Follow-up in 6-8 weeks.     Risk Assessment:  Risk Assessment: Tete Underwood is currently a low acute risk of suicide " "and self-harm due to no past suicide attempt(s) and not currently endorsing thoughts of suicide. Tete Underwood is currently a low acute risk of violence and harm to others due to no past history of violence and not currently threatening others.  Suicidal Risk Factors: , history of trauma/abuse, chronic medical illness, and current psychiatric illness  Violence Risk Factors: none  Protective Factors: strong coping skills, sense of responsibility towards family, social support/connectedness, moral objections to suicide, child related concerns/living with child <18 years, hopefulness/future orientation, and employment     Provided with crisis/emergency resources, including Mobile Crisis [phone], Crisis Text Line (text 4GMUL at 371637) and the National Suicide Prevention Lifeline hotline 1-787.436.6774. Agrees to call 988 or go to the nearest emergency department if she feels unsafe, or has suicidal thinking with a plan or intent.       Reason for Visit:  Follow-up for medication management.     HPI:  Since her last visit,     Doing really well.   Has started fluoxetine (intermittent tx for PMDD) and it's been working very well.   Notices a big difference during the time she takes it.   Feels much less irritable, less reactive.  Not as down, not as stressed when she takes it.   Still probably could be more improvement in sx, but noticeable change.   Within a few days of stopping the fluoxetine, \"I can definitely feel a shift.\"   No significant changes in sleep, no excessive energy.     Otherwise doing well with remaining medications.   Still meeting with individual therapist weekly, and finds it very beneficial.   Daughter is doing really well right now.   Wasn't home for [place] holiday last year, so is really excited about the holidays this year.   Relationship is going well.   No new concerns otherwise.     Denies suicidal ideation or a passive death wish.     No new medications or health " "problems.       Current Psychiatric Medications:  Alprazolam 0.25 mg PO PRN for severe anxiety/panic- managed by PCP (rarely taking)  Bupropion  mg PO BID  Fluoxetine 10 mg PO daily x 2 weeks prior to menses  Quetiapine 50 mg PO at bedtime  Adderall XR 20 mg PO daily      Record Review: brief     Medical Review Of Systems:  Pertinent items are noted in HPI.    Psychiatric Review Of Systems:  As noted in HPI.        Objective   Mental Status Exam  General Appearance: Well groomed, appropriate eye contact  Attitude/Behavior: Cooperative  Motor: No psychomotor agitation or retardation, no tremor or other abnormal movements  Speech: Normal rate, volume, prosody  Mood: \"Pretty good.\"  Affect: Euthymic, full-range, Congruent with mood and topic of conversation  Thought Process: Linear, goal directed  Thought Associations: No loosening of associations  Thought Content: Normal  Perception: No perceptual abnormalities noted  Sensorium: Alert and oriented to person, place, time and situation  Insight: Intact  Judgement: Intact  Cognition: Cognitively intact to conversational testing with respect to attention, orientation, fund of knowledge, recent and remote memory, and language    Vitals:  There were no vitals filed for this visit.    Labs:  not applicable        Rachel Small, APRN-CNP, APRN-CNS  "

## 2024-11-04 NOTE — ASSESSMENT & PLAN NOTE
As noted below, starting low-dose fluoxetine routinely for mood and anxious sx, with intermittent titration for tx of PMDD sx. Discussed risks/benefits/alternatives, including specific risks associated with use of SSRI in individuals with a history of bipolar disorder, and importance of continuing tx with mood stabilizing medication. She is consistently adherent to prescribed medications, including quetiapine. Will otherwise continue medications without dose changes today.     Start fluoxetine 10 mg PO daily as noted above, with titration to 20 mg x 2 weeks per month for PMDD sx.   Continue quetiapine 50 mg PO at bedtime- no refill needed today.    Continue bupropion  mg PO BID- no refill needed today.    Continue individual therapy.

## 2024-11-04 NOTE — ASSESSMENT & PLAN NOTE
Tolerating and benefiting from current regimen. No indication for medication changes today.      Continue Adderall ER 20 mg PO daily- refills placed on file at [facility] pharmacy today.    Reviewed OARRS, no discrepancies or concerns. Discussed risk of insomnia, anxiety, agitation, anorexia, autonomic effects, toxicity, psychosis, dependence, tolerance, abuse.     Last UDS 2/26/2024- consistent with current rx, no discrepancies.

## 2024-11-11 ENCOUNTER — SPECIALTY PHARMACY (OUTPATIENT)
Dept: PHARMACY | Facility: CLINIC | Age: 44
End: 2024-11-11

## 2024-11-18 ENCOUNTER — SPECIALTY PHARMACY (OUTPATIENT)
Dept: PHARMACY | Facility: CLINIC | Age: 44
End: 2024-11-18

## 2024-11-19 DIAGNOSIS — L23.9 ALLERGIC CONTACT DERMATITIS, UNSPECIFIED TRIGGER: Primary | ICD-10-CM

## 2024-11-19 RX ORDER — PREDNISONE 10 MG/1
TABLET ORAL
Qty: 18 TABLET | Refills: 0 | Status: SHIPPED | OUTPATIENT
Start: 2024-11-19 | End: 2024-12-04

## 2024-11-20 ENCOUNTER — PHARMACY VISIT (OUTPATIENT)
Dept: PHARMACY | Facility: CLINIC | Age: 44
End: 2024-11-20
Payer: COMMERCIAL

## 2024-11-20 PROCEDURE — RXMED WILLOW AMBULATORY MEDICATION CHARGE

## 2024-11-26 DIAGNOSIS — F31.62 BIPOLAR 1 DISORDER, MIXED, MODERATE (MULTI): ICD-10-CM

## 2024-11-26 DIAGNOSIS — F32.81 PMDD (PREMENSTRUAL DYSPHORIC DISORDER): ICD-10-CM

## 2024-11-26 RX ORDER — FLUOXETINE HYDROCHLORIDE 20 MG/1
20 CAPSULE ORAL DAILY
Qty: 15 CAPSULE | Refills: 11 | Status: SHIPPED | OUTPATIENT
Start: 2024-11-26

## 2024-11-26 RX ORDER — FLUOXETINE 10 MG/1
10 CAPSULE ORAL DAILY
Qty: 15 CAPSULE | Refills: 11 | Status: SHIPPED | OUTPATIENT
Start: 2024-11-26

## 2024-12-06 ENCOUNTER — PATIENT MESSAGE (OUTPATIENT)
Dept: PRIMARY CARE | Facility: CLINIC | Age: 44
End: 2024-12-06
Payer: COMMERCIAL

## 2024-12-06 DIAGNOSIS — E66.9 OBESITY (BMI 30-39.9): ICD-10-CM

## 2024-12-06 DIAGNOSIS — F43.22 ADJUSTMENT DISORDER WITH ANXIOUS MOOD: ICD-10-CM

## 2024-12-06 DIAGNOSIS — F32.81 PMDD (PREMENSTRUAL DYSPHORIC DISORDER): ICD-10-CM

## 2024-12-06 DIAGNOSIS — F31.62 BIPOLAR 1 DISORDER, MIXED, MODERATE (MULTI): ICD-10-CM

## 2024-12-06 RX ORDER — FLUOXETINE HYDROCHLORIDE 20 MG/1
20 CAPSULE ORAL DAILY
Qty: 30 CAPSULE | Refills: 11 | Status: SHIPPED | OUTPATIENT
Start: 2024-12-06 | End: 2025-12-06

## 2024-12-11 ENCOUNTER — OFFICE VISIT (OUTPATIENT)
Dept: URGENT CARE | Age: 44
End: 2024-12-11
Payer: COMMERCIAL

## 2024-12-11 VITALS
WEIGHT: 142 LBS | DIASTOLIC BLOOD PRESSURE: 89 MMHG | SYSTOLIC BLOOD PRESSURE: 126 MMHG | RESPIRATION RATE: 16 BRPM | HEART RATE: 87 BPM | OXYGEN SATURATION: 98 % | TEMPERATURE: 97.8 F | BODY MASS INDEX: 25.97 KG/M2

## 2024-12-11 DIAGNOSIS — R30.0 DYSURIA: Primary | ICD-10-CM

## 2024-12-11 DIAGNOSIS — R35.0 URINARY FREQUENCY: ICD-10-CM

## 2024-12-11 LAB
POC APPEARANCE, URINE: ABNORMAL
POC BILIRUBIN, URINE: NEGATIVE
POC BLOOD, URINE: ABNORMAL
POC COLOR, URINE: ABNORMAL
POC GLUCOSE, URINE: NEGATIVE MG/DL
POC KETONES, URINE: NEGATIVE MG/DL
POC LEUKOCYTES, URINE: ABNORMAL
POC NITRITE,URINE: NEGATIVE
POC PH, URINE: 6 PH
POC PROTEIN, URINE: ABNORMAL MG/DL
POC SPECIFIC GRAVITY, URINE: >=1.03
POC UROBILINOGEN, URINE: 0.2 EU/DL

## 2024-12-11 PROCEDURE — 3074F SYST BP LT 130 MM HG: CPT | Performed by: FAMILY MEDICINE

## 2024-12-11 PROCEDURE — 87086 URINE CULTURE/COLONY COUNT: CPT

## 2024-12-11 PROCEDURE — 1036F TOBACCO NON-USER: CPT | Performed by: FAMILY MEDICINE

## 2024-12-11 PROCEDURE — 99214 OFFICE O/P EST MOD 30 MIN: CPT | Performed by: FAMILY MEDICINE

## 2024-12-11 PROCEDURE — 81003 URINALYSIS AUTO W/O SCOPE: CPT | Performed by: FAMILY MEDICINE

## 2024-12-11 PROCEDURE — 87186 SC STD MICRODIL/AGAR DIL: CPT

## 2024-12-11 PROCEDURE — 3079F DIAST BP 80-89 MM HG: CPT | Performed by: FAMILY MEDICINE

## 2024-12-11 RX ORDER — PHENAZOPYRIDINE HYDROCHLORIDE 200 MG/1
200 TABLET, FILM COATED ORAL 3 TIMES DAILY PRN
Qty: 6 TABLET | Refills: 0 | Status: SHIPPED | OUTPATIENT
Start: 2024-12-11 | End: 2024-12-13

## 2024-12-11 RX ORDER — NITROFURANTOIN 25; 75 MG/1; MG/1
100 CAPSULE ORAL 2 TIMES DAILY
Qty: 14 CAPSULE | Refills: 0 | Status: SHIPPED | OUTPATIENT
Start: 2024-12-11 | End: 2024-12-18

## 2024-12-11 NOTE — PROGRESS NOTES
HPI:  Patient states that she started to have urinary frequency and dysuria today.  No fever/chills.  No n/v.  No flank pain.  No vaginal discharge.      ROS:  No fever/chills  No flank pain  No n/v    PE:    A&O x3  NCAT  No conjunctival erythema  RRR  CTAB  Abd:soft, nd, mild suprapubic tndop  no cva tndop  MOEx4  No focal deficit  Judgement normal    Results:  UA: +WBC, +blood    A/P:   Dysuria  Urinary frequency    Increase fluids.  We will call you with test results if you need further treatment.  Eat yogurt and take probiotics when on medication.  If your culture is negative take antibiotic for symptomatic relief and recheck with your doctor if symptoms persist.  Follow up with your doctor in 4-5 days if no improvement.  Go to the ER if starts getting worse.

## 2024-12-12 RX ORDER — FLUOXETINE 10 MG/1
CAPSULE ORAL
COMMUNITY
Start: 2024-12-09 | End: 2024-12-16 | Stop reason: DRUGHIGH

## 2024-12-13 ENCOUNTER — SPECIALTY PHARMACY (OUTPATIENT)
Dept: PHARMACY | Facility: CLINIC | Age: 44
End: 2024-12-13

## 2024-12-13 PROCEDURE — RXMED WILLOW AMBULATORY MEDICATION CHARGE

## 2024-12-14 LAB — BACTERIA UR CULT: ABNORMAL

## 2024-12-16 ENCOUNTER — OFFICE VISIT (OUTPATIENT)
Dept: URGENT CARE | Age: 44
End: 2024-12-16
Payer: COMMERCIAL

## 2024-12-16 ENCOUNTER — APPOINTMENT (OUTPATIENT)
Dept: BEHAVIORAL HEALTH | Facility: CLINIC | Age: 44
End: 2024-12-16
Payer: COMMERCIAL

## 2024-12-16 VITALS
HEART RATE: 89 BPM | OXYGEN SATURATION: 97 % | WEIGHT: 142 LBS | HEIGHT: 62 IN | DIASTOLIC BLOOD PRESSURE: 92 MMHG | BODY MASS INDEX: 26.13 KG/M2 | SYSTOLIC BLOOD PRESSURE: 127 MMHG | RESPIRATION RATE: 18 BRPM | TEMPERATURE: 97.9 F

## 2024-12-16 DIAGNOSIS — R30.0 DYSURIA: ICD-10-CM

## 2024-12-16 DIAGNOSIS — F90.0 ADHD (ATTENTION DEFICIT HYPERACTIVITY DISORDER), INATTENTIVE TYPE: ICD-10-CM

## 2024-12-16 DIAGNOSIS — R41.840 ATTENTION DISTURBANCE: ICD-10-CM

## 2024-12-16 DIAGNOSIS — N39.0 URINARY TRACT INFECTION WITHOUT HEMATURIA, SITE UNSPECIFIED: Primary | ICD-10-CM

## 2024-12-16 DIAGNOSIS — F43.22 ADJUSTMENT DISORDER WITH ANXIOUS MOOD: ICD-10-CM

## 2024-12-16 DIAGNOSIS — F32.81 PMDD (PREMENSTRUAL DYSPHORIC DISORDER): ICD-10-CM

## 2024-12-16 DIAGNOSIS — F31.62 BIPOLAR 1 DISORDER, MIXED, MODERATE (MULTI): ICD-10-CM

## 2024-12-16 LAB
POC APPEARANCE, URINE: ABNORMAL
POC BILIRUBIN, URINE: NEGATIVE
POC BLOOD, URINE: NEGATIVE
POC COLOR, URINE: ABNORMAL
POC GLUCOSE, URINE: NEGATIVE MG/DL
POC KETONES, URINE: ABNORMAL MG/DL
POC LEUKOCYTES, URINE: NEGATIVE
POC NITRITE,URINE: NEGATIVE
POC PH, URINE: 8 PH
POC PROTEIN, URINE: NEGATIVE MG/DL
POC SPECIFIC GRAVITY, URINE: 1.01
POC UROBILINOGEN, URINE: 0.2 EU/DL
PREGNANCY TEST URINE, POC: NEGATIVE

## 2024-12-16 PROCEDURE — 3080F DIAST BP >= 90 MM HG: CPT | Performed by: PHYSICIAN ASSISTANT

## 2024-12-16 PROCEDURE — 81025 URINE PREGNANCY TEST: CPT | Performed by: PHYSICIAN ASSISTANT

## 2024-12-16 PROCEDURE — 3074F SYST BP LT 130 MM HG: CPT | Performed by: PHYSICIAN ASSISTANT

## 2024-12-16 PROCEDURE — 3008F BODY MASS INDEX DOCD: CPT | Performed by: PHYSICIAN ASSISTANT

## 2024-12-16 PROCEDURE — 99214 OFFICE O/P EST MOD 30 MIN: CPT | Performed by: CLINICAL NURSE SPECIALIST

## 2024-12-16 PROCEDURE — 81003 URINALYSIS AUTO W/O SCOPE: CPT | Performed by: PHYSICIAN ASSISTANT

## 2024-12-16 PROCEDURE — 1036F TOBACCO NON-USER: CPT | Performed by: PHYSICIAN ASSISTANT

## 2024-12-16 PROCEDURE — 1036F TOBACCO NON-USER: CPT | Performed by: CLINICAL NURSE SPECIALIST

## 2024-12-16 PROCEDURE — 87086 URINE CULTURE/COLONY COUNT: CPT

## 2024-12-16 PROCEDURE — 99213 OFFICE O/P EST LOW 20 MIN: CPT | Performed by: PHYSICIAN ASSISTANT

## 2024-12-16 RX ORDER — QUETIAPINE FUMARATE 50 MG/1
50 TABLET, FILM COATED ORAL NIGHTLY
Qty: 90 TABLET | Refills: 1 | Status: SHIPPED | OUTPATIENT
Start: 2024-12-16 | End: 2025-06-14

## 2024-12-16 RX ORDER — BUPROPION HYDROCHLORIDE 100 MG/1
100 TABLET, EXTENDED RELEASE ORAL 2 TIMES DAILY
Qty: 180 TABLET | Refills: 1 | Status: SHIPPED | OUTPATIENT
Start: 2024-12-16 | End: 2025-06-14

## 2024-12-16 RX ORDER — FLUCONAZOLE 150 MG/1
150 TABLET ORAL ONCE
Qty: 1 TABLET | Refills: 0 | Status: SHIPPED | OUTPATIENT
Start: 2024-12-16 | End: 2024-12-16

## 2024-12-16 RX ORDER — SULFAMETHOXAZOLE AND TRIMETHOPRIM 800; 160 MG/1; MG/1
1 TABLET ORAL 2 TIMES DAILY
Qty: 14 TABLET | Refills: 0 | Status: SHIPPED | OUTPATIENT
Start: 2024-12-16 | End: 2024-12-23

## 2024-12-16 RX ORDER — ONDANSETRON 4 MG/1
4 TABLET, FILM COATED ORAL EVERY 6 HOURS
Qty: 20 TABLET | Refills: 0 | Status: SHIPPED | OUTPATIENT
Start: 2024-12-16

## 2024-12-16 ASSESSMENT — ENCOUNTER SYMPTOMS
DIFFICULTY URINATING: 0
DYSURIA: 1
DIARRHEA: 1
FEVER: 0
ABDOMINAL PAIN: 1
VOMITING: 1
FLANK PAIN: 0
HEMATURIA: 0
FREQUENCY: 1
BACK PAIN: 0

## 2024-12-16 ASSESSMENT — PATIENT HEALTH QUESTIONNAIRE - PHQ9
1. LITTLE INTEREST OR PLEASURE IN DOING THINGS: NOT AT ALL
2. FEELING DOWN, DEPRESSED OR HOPELESS: NOT AT ALL
SUM OF ALL RESPONSES TO PHQ9 QUESTIONS 1 & 2: 0

## 2024-12-16 NOTE — ASSESSMENT & PLAN NOTE
"Unable to continue with intermittent dose titration of fluoxetine d/t insurance coverage restrictions. Mutually agreed to continue fluoxetine at 20 mg dose throughout the month given benefit for PMDD sx, as well as mood and anxious sx overall.     As noted in previous visit, limited research/clinical guidelines for tx of PMDD in individuals with bipolar disorder. General approach is tx (intermittent or continuous) with low-dose SSRI or SNRI. She is currently prescribed bupropion, but evidence for its use in tx of PMDD is very limited. May be worth considering in the future. Will continue routine tx with low-dose fluoxetine, based on best available evidence. Reviewed low risk for \"manic switch\" in individuals with bipolar disorder receiving tx with an antidepressant. She will continue routine tx with mood stabilizing medication, which may mitigate this (low) risk further.     Continue fluoxetine 20 mg PO daily- no refill needed today.    "

## 2024-12-16 NOTE — ASSESSMENT & PLAN NOTE
Tolerating and benefiting from current regimen. No indication for medication changes today.      Continue Adderall ER 20 mg PO daily- refills on file at [facility] pharmacy.    Reviewed OARRS, no discrepancies or concerns. Discussed risk of insomnia, anxiety, agitation, anorexia, autonomic effects, toxicity, psychosis, dependence, tolerance, abuse.     Last UDS 2/26/2024- consistent with current rx, no discrepancies.

## 2024-12-16 NOTE — PROGRESS NOTES
"Subjective   Patient ID: Tete Underwood \"Socrates" is a 44 y.o. female. They present today with a chief complaint of Difficulty Urinating (Was seen at  and prescribed on Macrobid and Pyridium/Patient has uti but is not getting any better and bacteria is susceptible to Macrobid /Patient also states she thinks she has yeast infection /), Vomiting (Started last Thursday ), and Nausea.    History of Present Illness  Patient is a 44 year old female presenting for UTI symptoms. Reports she was seen at  on 12/11 for UTI. Started macrobid which she has been complaint with. Reports some of the bladder spasms have resolved but still having lower abdominal pressure, urgency, frequency, dysuria.  Denies hematuria, flank or back pain or fever. History of kidney stones but this feels different. Also has diarrhea and intermittent vomiting. Has still been able to tolerate fluids. History of UTIs in past but reports this is worse.       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    Doxycycline Unknown 08/24/2023    Formaldehyde Unknown 08/24/2023    Meperidine Unknown 08/24/2023    Meperidine (pf) Nausea/vomiting 08/31/2007    Morphine Unknown 08/24/2023    Tioconazole Unknown 08/24/2023       (Not in a hospital admission)       Past Medical History:   Diagnosis Date    Acute bronchitis, unspecified 01/20/2022    Bronchitis, subacute    Chronic cough 01/20/2022    Post-COVID chronic cough    Injury of conjunctiva and corneal abrasion without foreign body, right eye, initial encounter 03/30/2017    Abrasion of cornea, right    Obesity, unspecified 02/19/2022    Class 2 obesity with body mass index (BMI) of 37.0 to 37.9 in adult    Obesity, unspecified 03/06/2022    Class 2 obesity with body mass index (BMI) of 36.0 to 36.9 in adult    Obesity, " "unspecified     Class 1 obesity with body mass index (BMI) of 31.0 to 31.9 in adult    Other chest pain 2022    Chest heaviness    Otitis media, unspecified, left ear 2018    Left otitis media    Pain in right lower leg 2022    Right calf pain    Personal history of other diseases of the respiratory system 2018    History of pharyngitis    Personal history of other diseases of the respiratory system 2018    History of sore throat       Past Surgical History:   Procedure Laterality Date     SECTION, CLASSIC  2017     Section    CT ANGIO NECK  2022    CT NECK ANGIO W AND WO IV CONTRAST 2022 [facility]    CT HEAD ANGIO W AND WO IV CONTRAST  2022    CT HEAD ANGIO W AND WO IV CONTRAST 2022 [facility]    HERNIA REPAIR  2017    Hernia Repair    OTHER SURGICAL HISTORY  2022    Breast augmentation        reports that she quit smoking about 23 years ago. Her smoking use included cigarettes. She started smoking about 29 years ago. She has a 6 pack-year smoking history. She has been exposed to tobacco smoke. She has never used smokeless tobacco. She reports that she does not drink alcohol and does not use drugs.    Review of Systems  Review of Systems   Constitutional:  Negative for fever.   Gastrointestinal:  Positive for abdominal pain, diarrhea and vomiting.   Genitourinary:  Positive for dysuria, frequency and urgency. Negative for difficulty urinating, flank pain and hematuria.   Musculoskeletal:  Negative for back pain.                                  Objective    Vitals:    24 1340   BP: (!) 127/92   BP Location: Left arm   Patient Position: Sitting   BP Cuff Size: Adult   Pulse: 89   Resp: 18   Temp: 36.6 °C (97.9 °F)   SpO2: 97%   Weight: 64.4 kg (142 lb)   Height: 1.575 m (5' 2\")     Patient's last menstrual period was 2024 (exact date).    Physical Exam  Constitutional:       General: She is not in " acute distress.     Appearance: Normal appearance. She is normal weight. She is not ill-appearing or toxic-appearing.   Eyes:      General: No scleral icterus.        Right eye: No discharge.         Left eye: No discharge.      Conjunctiva/sclera: Conjunctivae normal.   Cardiovascular:      Rate and Rhythm: Normal rate and regular rhythm.      Heart sounds: Normal heart sounds. No murmur heard.     No friction rub. No gallop.   Pulmonary:      Effort: Pulmonary effort is normal. No respiratory distress.      Breath sounds: Normal breath sounds. No stridor. No wheezing, rhonchi or rales.   Abdominal:      General: Abdomen is flat.      Tenderness: There is abdominal tenderness (suprapubic). There is no right CVA tenderness or left CVA tenderness.   Neurological:      Mental Status: She is alert.   Psychiatric:         Mood and Affect: Mood normal.         Behavior: Behavior normal.         Thought Content: Thought content normal.         Procedures    Point of Care Test & Imaging Results from this visit  Results for orders placed or performed in visit on 12/16/24   POCT pregnancy, urine manually resulted   Result Value Ref Range    Preg Test, Ur Negative Negative   POCT UA Automated manually resulted   Result Value Ref Range    POC Color, Urine Orange (A) Straw, Yellow, Light-Yellow    POC Appearance, Urine Cloudy (A) Clear    POC Glucose, Urine NEGATIVE NEGATIVE mg/dl    POC Bilirubin, Urine NEGATIVE NEGATIVE    POC Ketones, Urine TRACE (A) NEGATIVE mg/dl    POC Specific Gravity, Urine 1.015 1.005 - 1.035    POC Blood, Urine NEGATIVE NEGATIVE    POC PH, Urine 8.0 No Reference Range Established PH    POC Protein, Urine NEGATIVE NEGATIVE, 30 (1+) mg/dl    POC Urobilinogen, Urine 0.2 0.2, 1.0 EU/DL    Poc Nitrite, Urine NEGATIVE NEGATIVE    POC Leukocytes, Urine NEGATIVE NEGATIVE      No results found.    Diagnostic study results (if any) were reviewed by Alisa Carney PA-C.    Assessment/Plan   Allergies,  medications, history, and pertinent labs/EKGs/Imaging reviewed by Alisa Carney PA-C.     Medical Decision Making  Patient presenting for persistent urinary symptoms despite being on macrobid. Urine culture from 12/11 was susceptible. She is afebrile without tachycardia or hypotension, low suspicion for sepsis. No CVA tenderness to suggest pyelonephritis or unilateral pain to suggest kidney stones.   Abdominal exam without guarding. Urine today appears improved however given persistent symptoms and associated vomiting, unclear if it is intolerance to the macrobid, will switch to bactrim. Discussed other etiologies for symptoms including diverticulitis and kidney stones and if no improvement or worsening symptoms including fever, inability to tolerate fluids, flank or back pain she should go to ER for possible imaging.     At time of discharge, patient was clinically well-appearing and appropriate for outpatient management. The patient/parent/guardian was educated regarding diagnosis, supportive care, OTC and Rx medications. The patient/parent/guardian was given the opportunity to ask questions prior to discharge. They verbalized understanding of discussion of treatment plan, expected course of illness and/or injury, indications on when to return to , when to seek further evaluation in ED/call 911, and the need to follow up with PCP and/or specialist as referred. Patient/parent/guardian was provided with work/school documentation if requested. Patient stable upon discharge.     Orders and Diagnoses  Diagnoses and all orders for this visit:  Urinary tract infection without hematuria, site unspecified  -     Urine Culture  -     sulfamethoxazole-trimethoprim (Bactrim DS) 800-160 mg tablet; Take 1 tablet by mouth 2 times a day for 7 days.  -     ondansetron (Zofran) 4 mg tablet; Take 1 tablet (4 mg) by mouth every 6 hours.  Dysuria  -     POCT pregnancy, urine manually resulted  -     POCT UA Automated manually  resulted  -     fluconazole (Diflucan) 150 mg tablet; Take 1 tablet (150 mg) by mouth 1 time for 1 dose.      Medical Admin Record      Patient disposition: Home    Electronically signed by Alisa Carney PA-C  2:06 PM

## 2024-12-16 NOTE — ASSESSMENT & PLAN NOTE
As noted below, now taking low-dose fluoxetine routinely for mood and anxious sx. Discussed risks/benefits/alternatives, including specific risks associated with use of SSRI in individuals with a history of bipolar disorder, and importance of continuing tx with mood stabilizing medication. She is consistently adherent to prescribed medications, including quetiapine. Will otherwise continue medications without dose changes today.     Continue fluoxetine 20 mg PO daily. -no refill needed today.    Continue quetiapine 50 mg PO at bedtime- refill sent to pharmacy today.   Continue bupropion  mg PO BID- refill sent to pharmacy today.    Continue individual therapy.

## 2024-12-16 NOTE — PROGRESS NOTES
"Outpatient Psychiatry      Subjective   Tete Evanso \"Sivan\", is a 44 y.o. female,  seen in follow-up.      Assessment/Plan   Problem List Items Addressed This Visit             ICD-10-CM    Bipolar 1 disorder, mixed, moderate (Multi) F31.62     As noted below, now taking low-dose fluoxetine routinely for mood and anxious sx. Discussed risks/benefits/alternatives, including specific risks associated with use of SSRI in individuals with a history of bipolar disorder, and importance of continuing tx with mood stabilizing medication. She is consistently adherent to prescribed medications, including quetiapine. Will otherwise continue medications without dose changes today.     Continue fluoxetine 20 mg PO daily. -no refill needed today.    Continue quetiapine 50 mg PO at bedtime- refill sent to pharmacy today.   Continue bupropion  mg PO BID- refill sent to pharmacy today.    Continue individual therapy.          Relevant Medications    buPROPion SR (Wellbutrin SR) 100 mg 12 hr tablet    QUEtiapine (SEROquel) 50 mg tablet    Other Relevant Orders    Follow Up In Psychiatry    Adjustment disorder with anxious mood F43.22     Continue plan as noted above.   Rarely taking alprazolam PRN- has not taken since last visit. Reasonable to continue very low dose as needed for severe sx, often associated with psychosocial stressors. No refill needed today.          Relevant Orders    Follow Up In Psychiatry    ADHD (attention deficit hyperactivity disorder), inattentive type F90.0     Tolerating and benefiting from current regimen. No indication for medication changes today.      Continue Adderall ER 20 mg PO daily- refills on file at [facility] pharmacy.    Reviewed OARRS, no discrepancies or concerns. Discussed risk of insomnia, anxiety, agitation, anorexia, autonomic effects, toxicity, psychosis, dependence, tolerance, abuse.     Last UDS 2/26/2024- consistent with current rx, no discrepancies.          Relevant Orders    " "Follow Up In Psychiatry    PMDD (premenstrual dysphoric disorder) F32.81     Unable to continue with intermittent dose titration of fluoxetine d/t insurance coverage restrictions. Mutually agreed to continue fluoxetine at 20 mg dose throughout the month given benefit for PMDD sx, as well as mood and anxious sx overall.     As noted in previous visit, limited research/clinical guidelines for tx of PMDD in individuals with bipolar disorder. General approach is tx (intermittent or continuous) with low-dose SSRI or SNRI. She is currently prescribed bupropion, but evidence for its use in tx of PMDD is very limited. May be worth considering in the future. Will continue routine tx with low-dose fluoxetine, based on best available evidence. Reviewed low risk for \"manic switch\" in individuals with bipolar disorder receiving tx with an antidepressant. She will continue routine tx with mood stabilizing medication, which may mitigate this (low) risk further.     Continue fluoxetine 20 mg PO daily- no refill needed today.            Other Visit Diagnoses         Codes    Attention disturbance     R41.840    Relevant Medications    buPROPion SR (Wellbutrin SR) 100 mg 12 hr tablet            Follow-up in 8-10 weeks.     Risk Assessment:  Risk Assessment: Tete Underwood is currently a low acute risk of suicide and self-harm due to no past suicide attempt(s) and not currently endorsing thoughts of suicide. Tete Underwood is currently a low acute risk of violence and harm to others due to no past history of violence and not currently threatening others.  Suicidal Risk Factors: , history of trauma/abuse, chronic medical illness, and current psychiatric illness  Violence Risk Factors: none  Protective Factors: strong coping skills, sense of responsibility towards family, social support/connectedness, moral objections to suicide, child related concerns/living with child <18 years, hopefulness/future orientation, and " "employment     Provided with crisis/emergency resources, including Mobile Crisis [phone], Crisis Text Line (text 6XRJK ik 822687) and the National Suicide Prevention Lifeline hotline 1-990.683.2231. Agrees to call 988 or go to the nearest emergency department if she feels unsafe, or has suicidal thinking with a plan or intent.       Reason for Visit:  Follow-up for medication management.     HPI:  Since her last visit,     Things have been going pretty well.   Has been taking fluoxetine 20 mg dose daily, rather than intermittently increasing from 10--> 20 mg for PMDD sx d/t insurance coverage restrictions.   Mood has been, \"a lot better.\"  Family has even expressed that they notice a difference.  Not as reactive in the weeks prior to her period.  Overall, feeling less down, \"I feel more like myself.\"    Sleeping well.   Energy level has been good.   No significant mood or anxious sx to report.     Continues to report benefit from Adderall for ADHD sx.  More able to organize and complete tasks, managing time well so she's not working beyond scheduled hours.    No new concerns today otherwise.     Denies suicidal ideation or a passive death wish.     Taking Macrobid short term for UTI. Otherwise, no new medications or health problems.       Current Psychiatric Medications:  Alprazolam 0.25 mg PO PRN for severe anxiety/panic- managed by PCP (rarely taking)  Bupropion  mg PO BID  Fluoxetine 20 mg PO daily   Quetiapine 50 mg PO at bedtime  Adderall XR 20 mg PO daily      Record Review: brief     Medical Review Of Systems:  Pertinent items are noted in HPI.    Psychiatric Review Of Systems:  As noted in HPI.        Objective   Mental Status Exam  General Appearance: Well groomed, appropriate eye contact  Attitude/Behavior: Cooperative  Motor: No psychomotor agitation or retardation, no tremor or other abnormal movements  Speech: Normal rate, volume, prosody  Mood: \"Good.\"  Affect: Euthymic, full-range, " Congruent with mood and topic of conversation  Thought Process: Linear, goal directed  Thought Associations: No loosening of associations  Thought Content: Normal  Perception: No perceptual abnormalities noted  Sensorium: Alert and oriented to person, place, time and situation  Insight: Intact  Judgement: Intact  Cognition: Cognitively intact to conversational testing with respect to attention, orientation, fund of knowledge, recent and remote memory, and language    Vitals:  There were no vitals filed for this visit.    Labs:  not applicable        Rachel Small, APRN-CNP, APRN-CNS

## 2024-12-16 NOTE — LETTER
December 16, 2024     Patient: Tete Underwood   YOB: 1980   Date of Visit: 12/16/2024       To Whom It May Concern:    It is my medical opinion that Tete Underwood may return to work on 12/17/24 .    If you have any questions or concerns, please don't hesitate to call.         Sincerely,        Alisa Carney PA-C    CC: No Recipients

## 2024-12-18 ENCOUNTER — PHARMACY VISIT (OUTPATIENT)
Dept: PHARMACY | Facility: CLINIC | Age: 44
End: 2024-12-18
Payer: COMMERCIAL

## 2024-12-18 ENCOUNTER — SPECIALTY PHARMACY (OUTPATIENT)
Dept: PHARMACY | Facility: CLINIC | Age: 44
End: 2024-12-18

## 2024-12-18 LAB — BACTERIA UR CULT: NORMAL

## 2024-12-20 ENCOUNTER — APPOINTMENT (OUTPATIENT)
Dept: PRIMARY CARE | Facility: CLINIC | Age: 44
End: 2024-12-20
Payer: COMMERCIAL

## 2025-01-13 ENCOUNTER — SPECIALTY PHARMACY (OUTPATIENT)
Dept: PHARMACY | Facility: CLINIC | Age: 45
End: 2025-01-13

## 2025-01-13 ENCOUNTER — PHARMACY VISIT (OUTPATIENT)
Dept: PHARMACY | Facility: CLINIC | Age: 45
End: 2025-01-13
Payer: COMMERCIAL

## 2025-01-13 PROCEDURE — RXMED WILLOW AMBULATORY MEDICATION CHARGE

## 2025-01-14 DIAGNOSIS — F90.0 ADHD (ATTENTION DEFICIT HYPERACTIVITY DISORDER), INATTENTIVE TYPE: ICD-10-CM

## 2025-01-14 RX ORDER — DEXTROAMPHETAMINE SACCHARATE, AMPHETAMINE ASPARTATE MONOHYDRATE, DEXTROAMPHETAMINE SULFATE AND AMPHETAMINE SULFATE 5; 5; 5; 5 MG/1; MG/1; MG/1; MG/1
20 CAPSULE, EXTENDED RELEASE ORAL DAILY
Qty: 30 CAPSULE | Refills: 0 | Status: SHIPPED | OUTPATIENT
Start: 2025-02-20 | End: 2025-03-22

## 2025-01-14 RX ORDER — DEXTROAMPHETAMINE SACCHARATE, AMPHETAMINE ASPARTATE MONOHYDRATE, DEXTROAMPHETAMINE SULFATE AND AMPHETAMINE SULFATE 5; 5; 5; 5 MG/1; MG/1; MG/1; MG/1
20 CAPSULE, EXTENDED RELEASE ORAL DAILY
Qty: 30 CAPSULE | Refills: 0 | Status: SHIPPED | OUTPATIENT
Start: 2025-03-20 | End: 2025-04-19

## 2025-01-14 RX ORDER — DEXTROAMPHETAMINE SACCHARATE, AMPHETAMINE ASPARTATE MONOHYDRATE, DEXTROAMPHETAMINE SULFATE AND AMPHETAMINE SULFATE 5; 5; 5; 5 MG/1; MG/1; MG/1; MG/1
20 CAPSULE, EXTENDED RELEASE ORAL DAILY
Qty: 30 CAPSULE | Refills: 0 | Status: SHIPPED | OUTPATIENT
Start: 2025-01-23 | End: 2025-02-22

## 2025-01-22 ENCOUNTER — SPECIALTY PHARMACY (OUTPATIENT)
Dept: PHARMACY | Facility: CLINIC | Age: 45
End: 2025-01-22

## 2025-01-31 DIAGNOSIS — L20.9 ATOPIC DERMATITIS, UNSPECIFIED TYPE: ICD-10-CM

## 2025-02-03 DIAGNOSIS — L50.9 URTICARIA: Primary | ICD-10-CM

## 2025-02-03 PROCEDURE — RXMED WILLOW AMBULATORY MEDICATION CHARGE

## 2025-02-03 RX ORDER — DUPILUMAB 300 MG/2ML
INJECTION, SOLUTION SUBCUTANEOUS
Qty: 4 ML | Refills: 0 | Status: SHIPPED | OUTPATIENT
Start: 2025-02-03 | End: 2025-08-04

## 2025-02-06 ENCOUNTER — APPOINTMENT (OUTPATIENT)
Dept: BEHAVIORAL HEALTH | Facility: CLINIC | Age: 45
End: 2025-02-06
Payer: COMMERCIAL

## 2025-02-06 DIAGNOSIS — F32.81 PMDD (PREMENSTRUAL DYSPHORIC DISORDER): ICD-10-CM

## 2025-02-06 DIAGNOSIS — F43.22 ADJUSTMENT DISORDER WITH ANXIOUS MOOD: ICD-10-CM

## 2025-02-06 DIAGNOSIS — F90.0 ADHD (ATTENTION DEFICIT HYPERACTIVITY DISORDER), INATTENTIVE TYPE: ICD-10-CM

## 2025-02-06 DIAGNOSIS — F31.62 BIPOLAR 1 DISORDER, MIXED, MODERATE (MULTI): ICD-10-CM

## 2025-02-06 PROCEDURE — 99214 OFFICE O/P EST MOD 30 MIN: CPT | Performed by: CLINICAL NURSE SPECIALIST

## 2025-02-06 RX ORDER — FLUOXETINE 10 MG/1
10 CAPSULE ORAL DAILY
Qty: 30 CAPSULE | Refills: 2 | Status: SHIPPED | OUTPATIENT
Start: 2025-02-06 | End: 2025-05-07

## 2025-02-06 NOTE — PROGRESS NOTES
"Outpatient Psychiatry      Subjective   Tete Bermanolaro \"Sivan\", is a 44 y.o. female,  seen in follow-up.      Assessment/Plan   Problem List Items Addressed This Visit             ICD-10-CM    Bipolar 1 disorder, mixed, moderate (Multi) F31.62     Plan to decrease fluoxetine dose as noted below. Will otherwise continue medications without dose changes today.     Decrease fluoxetine to 10 mg PO daily.   Continue quetiapine 50 mg PO at bedtime- no refill needed today.    Continue bupropion  mg PO BID- no refill needed today.    Continue individual therapy.          Relevant Medications    FLUoxetine (PROzac) 10 mg capsule    Other Relevant Orders    Follow Up In Psychiatry    Adjustment disorder with anxious mood F43.22     Plan as noted above.   Rarely taking alprazolam PRN- has not taken since last visit. Reasonable to continue very low dose as needed for severe sx, often associated with psychosocial stressors. No refill needed today.          Relevant Medications    FLUoxetine (PROzac) 10 mg capsule    Other Relevant Orders    Follow Up In Psychiatry    ADHD (attention deficit hyperactivity disorder), inattentive type F90.0     Tolerating and benefiting from current regimen. No indication for medication changes today.      Continue Adderall ER 20 mg PO daily- refills on file at [facility] pharmacy.    Reviewed OARRS, no discrepancies or concerns. Discussed risk of insomnia, anxiety, agitation, anorexia, autonomic effects, toxicity, psychosis, dependence, tolerance, abuse.     Last UDS 2/26/2024- consistent with current rx, no discrepancies. Will order repeat/annual UDS at follow up.          Relevant Orders    Follow Up In Psychiatry    PMDD (premenstrual dysphoric disorder) F32.81     Some concern that recent, persistent anxious sx may reflect mood instability a/w primary diagnosis of bipolar disorder, especially in context of recent addition of fluoxetine to routine medication regimen. Discussed that current " "presentation may reflect mild, mixed mood episode, and that it would be prudent to attempt dose reduction in fluoxetine (may need to fully discontinue).    Decrease fluoxetine to 10 mg PO daily.     As noted in previous visit, limited research/clinical guidelines for tx of PMDD in individuals with bipolar disorder. General approach is tx (intermittent or continuous) with low-dose SSRI or SNRI. She is currently prescribed bupropion, but evidence for its use in tx of PMDD is very limited. May be worth considering in the future. Reviewed low risk for \"manic switch\" in individuals with bipolar disorder receiving tx with an antidepressant. She will continue routine tx with mood stabilizing medication, which may mitigate this (low) risk further.            Relevant Medications    FLUoxetine (PROzac) 10 mg capsule       Follow-up in 4-6 weeks     Risk Assessment:  Risk Assessment: Tete Underwood is currently a low acute risk of suicide and self-harm due to no past suicide attempt(s) and not currently endorsing thoughts of suicide. Tete Underwood is currently a low acute risk of violence and harm to others due to no past history of violence and not currently threatening others.  Suicidal Risk Factors: , history of trauma/abuse, chronic medical illness, and current psychiatric illness  Violence Risk Factors: none  Protective Factors: strong coping skills, sense of responsibility towards family, social support/connectedness, moral objections to suicide, child related concerns/living with child <18 years, hopefulness/future orientation, and employment     Provided with crisis/emergency resources, including Mobile Crisis [phone], Crisis Text Line (text [id number] qt 714055) and the National Suicide Prevention Lifeline hotline 1-472.777.6967. Agrees to call 988 or go to the nearest emergency department if she feels unsafe, or has suicidal thinking with a plan or intent.     Reason for Visit:  Follow-up for " "medication management.     HPI:  Since her last visit,     Things are pretty good.   Past month or so anxiety has gotten a little bit worse.   No obvious precipitating events- possibly work-related stressors.  (+) racing thoughts, (+) ruminations (e.g. irrational worries about relationship)  Sleep has been good.  Energy level has been pretty good.   Job can be pretty mentally exhausting, and by the end of the day feels very drained.   Has stepped back from working as many extra shifts  Doesn't feel burned out, but trying to be proactive about not letting that happen.   Focusing on self-care, making a point to get exercise daily, reading or listening to an audio book for an hour each day to take her mind off of work-related stressors, using PTO to give herself a long weekend every few months.     Occasionally takes alprazolam PRN- estimates she's only taken it once in the past 2-3 months. Still helpful when she does take it.     Denies suicidal ideation or a passive death wish.     No new medications or health problems.       Current Psychiatric Medications:  Alprazolam 0.25 mg PO PRN for severe anxiety/panic- managed by PCP (rarely taking)  Bupropion  mg PO BID  Fluoxetine 20 mg PO daily   Quetiapine 50 mg PO at bedtime  Adderall XR 20 mg PO daily      Record Review: brief     Medical Review Of Systems:  Pertinent items are noted in HPI.    Psychiatric Review Of Systems:  As noted in HPI.        Objective   Mental Status Exam  General Appearance: Well groomed, appropriate eye contact  Attitude/Behavior: Cooperative  Motor: No psychomotor agitation or retardation, no tremor or other abnormal movements  Speech: Rapid Speech, pressured  Mood: \"Pretty good.\"  Affect: Congruent with mood and topic of conversation  Thought Process: Linear, goal directed  Thought Associations: No loosening of associations  Thought Content: Other: (comment) (some recent anxious and ruminative thinking.)  Perception: No perceptual " abnormalities noted  Sensorium: Alert and oriented to person, place, time and situation  Insight: Intact  Judgement: Intact  Cognition: Cognitively intact to conversational testing with respect to attention, orientation, fund of knowledge, recent and remote memory, and language    Vitals:  There were no vitals filed for this visit.    Labs:  not applicable        Rachel Small, APRN-CNP, APRN-CNS

## 2025-02-07 ENCOUNTER — PHARMACY VISIT (OUTPATIENT)
Dept: PHARMACY | Facility: CLINIC | Age: 45
End: 2025-02-07
Payer: COMMERCIAL

## 2025-02-10 NOTE — ASSESSMENT & PLAN NOTE
"Some concern that recent, persistent anxious sx may reflect mood instability a/w primary diagnosis of bipolar disorder, especially in context of recent addition of fluoxetine to routine medication regimen. Discussed that current presentation may reflect mild, mixed mood episode, and that it would be prudent to attempt dose reduction in fluoxetine (may need to fully discontinue).    Decrease fluoxetine to 10 mg PO daily.     As noted in previous visit, limited research/clinical guidelines for tx of PMDD in individuals with bipolar disorder. General approach is tx (intermittent or continuous) with low-dose SSRI or SNRI. She is currently prescribed bupropion, but evidence for its use in tx of PMDD is very limited. May be worth considering in the future. Reviewed low risk for \"manic switch\" in individuals with bipolar disorder receiving tx with an antidepressant. She will continue routine tx with mood stabilizing medication, which may mitigate this (low) risk further.     "

## 2025-02-10 NOTE — ASSESSMENT & PLAN NOTE
Plan to decrease fluoxetine dose as noted below. Will otherwise continue medications without dose changes today.     Decrease fluoxetine to 10 mg PO daily.   Continue quetiapine 50 mg PO at bedtime- no refill needed today.    Continue bupropion  mg PO BID- no refill needed today.    Continue individual therapy.

## 2025-02-12 NOTE — ASSESSMENT & PLAN NOTE
Tolerating and benefiting from current regimen. No indication for medication changes today.      Continue Adderall ER 20 mg PO daily- refills on file at [facility] pharmacy.    Reviewed OARRS, no discrepancies or concerns. Discussed risk of insomnia, anxiety, agitation, anorexia, autonomic effects, toxicity, psychosis, dependence, tolerance, abuse.     Last UDS 2/26/2024- consistent with current rx, no discrepancies. Will order repeat/annual UDS at follow up.

## 2025-02-12 NOTE — ASSESSMENT & PLAN NOTE
Plan as noted above.   Rarely taking alprazolam PRN- has not taken since last visit. Reasonable to continue very low dose as needed for severe sx, often associated with psychosocial stressors. No refill needed today.

## 2025-02-18 PROBLEM — M79.89 SWELLING OF RIGHT LOWER EXTREMITY: Status: RESOLVED | Noted: 2024-01-03 | Resolved: 2025-02-18

## 2025-02-18 PROBLEM — J02.9 PHARYNGITIS: Status: RESOLVED | Noted: 2024-01-03 | Resolved: 2025-02-18

## 2025-02-18 PROBLEM — R06.02 SHORTNESS OF BREATH AT REST: Status: RESOLVED | Noted: 2023-08-24 | Resolved: 2025-02-18

## 2025-02-18 NOTE — PROGRESS NOTES
"  Subjective   Patient ID:   [id number]   Tete Underwood \"Sivan\" is a 44 y.o. female who presents for Follow-up (Follow up dupixent, last injection 2/7/2025).    Chief Complaint   Patient presents with    Follow-up     Follow up dupixent, last injection 2/7/2025      Patient presents for F/U of DRESS syndrome, on Dupixent.    Since last visit, 8-4-23, patient reports Dupixent is working well for her.  She does feel like a \"fire\" under her skin when the injection is given via auto-injector every 2 weeks.  However, she denies any local reactions.  She tried to spread it out 6 months ago but had increased Sx.  She used to use the syringe and it did not burn.  She cannot self-administer and someone does it for her.      Patient is not requiring Zyrtec.      Objective   Pulse 70   Wt 65.8 kg (145 lb)   SpO2 98%   BMI 26.52 kg/m²      Physical Exam  Constitutional:       Appearance: Normal appearance.   HENT:      Head: Normocephalic and atraumatic.      Right Ear: External ear normal. There is no impacted cerumen.      Left Ear: External ear normal. There is no impacted cerumen.      Nose: No congestion or rhinorrhea.   Eyes:      Extraocular Movements: Extraocular movements intact.      Conjunctiva/sclera: Conjunctivae normal.      Pupils: Pupils are equal, round, and reactive to light.   Cardiovascular:      Rate and Rhythm: Normal rate and regular rhythm.      Heart sounds: No murmur heard.     No friction rub. No gallop.   Pulmonary:      Effort: No respiratory distress.      Breath sounds: No wheezing, rhonchi or rales.   Skin:     General: Skin is warm and dry.   Neurological:      Mental Status: She is alert.   Psychiatric:         Mood and Affect: Mood normal.         Behavior: Behavior normal.     Assessment/Plan     Atopic dermatitis  Patient is doing exceptionally well with Dupixent, but the autoinjector is painful for the patient and she wants to return to the syringe formulation.     She denies any other " side effects and continues to do well. She tried to spread out the injections to every 3 weeks but her itching returned.    By signing my name below, I, Wade Gorman, attest that this documentation has been prepared under the direction and in the presence of Lisa Khalil MD.  All medical record entries made by the Scribe were at my direction and personally dictated by me. I have reviewed the chart and agree that the record accurately reflects my personal performance of the history, physical exam, discussion and plan.

## 2025-02-19 ENCOUNTER — APPOINTMENT (OUTPATIENT)
Dept: ALLERGY | Facility: CLINIC | Age: 45
End: 2025-02-19
Payer: COMMERCIAL

## 2025-02-19 VITALS — OXYGEN SATURATION: 98 % | BODY MASS INDEX: 26.52 KG/M2 | WEIGHT: 145 LBS | HEART RATE: 70 BPM

## 2025-02-19 DIAGNOSIS — D72.10 DRUG REACTION WITH EOSINOPHILIA AND SYSTEMIC SYMPTOMS: Primary | ICD-10-CM

## 2025-02-19 DIAGNOSIS — T50.905A DRUG REACTION WITH EOSINOPHILIA AND SYSTEMIC SYMPTOMS: Primary | ICD-10-CM

## 2025-02-19 DIAGNOSIS — L20.9 ATOPIC DERMATITIS, UNSPECIFIED TYPE: ICD-10-CM

## 2025-02-19 DIAGNOSIS — L50.9 URTICARIA: ICD-10-CM

## 2025-02-19 PROCEDURE — 99214 OFFICE O/P EST MOD 30 MIN: CPT | Performed by: ALLERGY & IMMUNOLOGY

## 2025-02-19 PROCEDURE — 1036F TOBACCO NON-USER: CPT | Performed by: ALLERGY & IMMUNOLOGY

## 2025-02-19 RX ORDER — DUPILUMAB 300 MG/2ML
300 INJECTION, SOLUTION SUBCUTANEOUS
Qty: 12 ML | Refills: 3 | Status: SHIPPED | OUTPATIENT
Start: 2025-02-19 | End: 2026-02-19

## 2025-02-19 NOTE — PATIENT INSTRUCTIONS
Continue Dupixent 300 mg every 14 days but switch to the syringe.     Try spreading it out to every 3 weeks if you desire.    Follow up in 1 year or sooner if symptoms worsen prior.

## 2025-02-20 ENCOUNTER — SPECIALTY PHARMACY (OUTPATIENT)
Dept: PHARMACY | Facility: CLINIC | Age: 45
End: 2025-02-20

## 2025-02-20 PROBLEM — L20.9 ATOPIC DERMATITIS: Status: ACTIVE | Noted: 2025-02-20

## 2025-02-20 NOTE — PROGRESS NOTES
"[facility] Clinical Note  Initial Patient Education     Introduction  Tete Underwood \"Sivan\" is a 44 y.o. female who is on the specialty pharmacy service for management of: Asthma Core.    Tete Underwood \"Sivan\" is initiating the following therapy: Dupixent 300mg under the skin every 14 days.    Medication receipt date: Established on therapy  Duration of therapy: Maintenance    The most recent encounter visit with the referring prescriber Lisa Khalil MD on 2/19/2025 was reviewed. Pharmacy will continue to collaborate in the care of this patient with the referring prescriber.    Clinical Background  An initial assessment was conducted prior to first fill of the medication to determine the appropriateness of therapy given the patient's diagnosis, medication list, comorbidities, allergies, medical history, patient's ability to self administer medication, and therapeutic goals based on possible outcomes of therapy. Refer to initial assessment task completed on N/A date as dispensed prior to new pharmacy documentation workflow. Most recent DUR on previous pen rx was done on 2/3/2025 and new syringe rx will be reviewed by pharmacist prior to delivery.    Labs/Procedures for clinical appropriateness that were reviewed include: N/A    Education/Discussion  Tete was contacted on 2/20/2025 at 12:00 PM for a pharmacy visit with encounter number [id number] from:   [facility]  [address] [place] [id number]  Dept: [phone]  Dept Fax: [phone]  Tete consented to a Telephone visit, which was performed.    Medication Start Date (planned or actual): Established on therapy - reports Dupixent is working effectively but will get symptoms is she tries to further space out dosing (can try to do every 3 weeks as directed per provider)  Education was conducted prior to start of therapy? Missed education opportunity at time of first " "delivery    Education discussed includes the following:  Patient Education  Counseled the Patient on the Following : Theraputic rationale and expected outcomes, Expected duration of therapy, Doses and administration, Adherence and missed doses, Possible side effects and management, Lab monitoring and follow-up, Pharmacy contact information, Safe handling, storage, and disposal, Cost of medications  Learner: Patient  Education Method: Explanation  Education Response: Verbalizes understanding  Additional details of the medication specific counseling are found within the linked patient education flowsheet.     The follow up timeline was discussed. Every person responds to and reacts to therapy differently. Patient should be assessed for efficacy and tolerability in approximately: 3 months    Provided education on goals and possible outcomes of therapy:  Adherence with therapy  Timely completion of appropriate labs  Timely and appropriate follow up with provider  Identify and address medication interactions with presciption medications, OTC medications and supplements  Optimize or maintain quality of life  Dermatology: Prevent or reduce disease flares  Reduce use of topical agents (corticosteroids or other \"prn\" agents)  Reduce pain, itchiness, inflammation and body surface area affected by atopic dermatitis    The importance of adherence was discussed and they were advised to take the medication as prescribed by their provider.     Impression/Plan  Review and Assessment   Reviewed During This Encounter: Medications, Problem list  Medications Assessed for Appropriate Use, Dose, Route, Frequency, and Duration: Yes  Medication Reconciliation Completed: Yes  Drug Interactions Evaluated: No (Comment) (At DUR, no new medications reported by patient)  Clinically Relevant Drug Interactions Identified: No    This patient has not been identified as high risk due to Lack of high risk qualifiers.  The following action was taken: " N/A.    QOL/Patient Satisfaction  Rate your quality of life on scale of 1-10: 10 - Completely satisfied  Rate your satisfaction with  Specialty Pharmacy on scale of 1-10: 10 - Completely satisfied    The  Specialty Pharmacy Welcome packet may be viewed here:   Specialty Pharmacy Welcome Packet     Or by scanning QR code:      Provided contact information ([phone]) for [facility] and reviewed dispensing process, refill timeline and patient management follow up. Advised to contact the pharmacy if there are any adverse effects and/or changes to medication list, including prescriptions, OTC medications, herbal products, or supplements. Confirmed understanding of education conducted during assessment. All questions and concerns were addressed and patient was encouraged to reach out for additional questions or concerns.    Margarita Neumann, PharmD

## 2025-02-20 NOTE — ASSESSMENT & PLAN NOTE
Patient is doing exceptionally well with Dupixent, but the autoinjector is painful for the patient and she wants to return to the syringe formulation.     She denies any other side effects and continues to do well. She tried to spread out the injections to every 3 weeks but her itching returned.

## 2025-03-05 ENCOUNTER — SPECIALTY PHARMACY (OUTPATIENT)
Dept: PHARMACY | Facility: CLINIC | Age: 45
End: 2025-03-05

## 2025-03-06 ENCOUNTER — APPOINTMENT (OUTPATIENT)
Dept: BEHAVIORAL HEALTH | Facility: CLINIC | Age: 45
End: 2025-03-06
Payer: COMMERCIAL

## 2025-03-06 DIAGNOSIS — F31.62 BIPOLAR 1 DISORDER, MIXED, MODERATE (MULTI): ICD-10-CM

## 2025-03-06 DIAGNOSIS — F43.22 ADJUSTMENT DISORDER WITH ANXIOUS MOOD: ICD-10-CM

## 2025-03-06 DIAGNOSIS — F90.0 ADHD (ATTENTION DEFICIT HYPERACTIVITY DISORDER), INATTENTIVE TYPE: ICD-10-CM

## 2025-03-06 DIAGNOSIS — F32.81 PMDD (PREMENSTRUAL DYSPHORIC DISORDER): ICD-10-CM

## 2025-03-06 PROCEDURE — 1036F TOBACCO NON-USER: CPT | Performed by: CLINICAL NURSE SPECIALIST

## 2025-03-06 PROCEDURE — 99214 OFFICE O/P EST MOD 30 MIN: CPT | Performed by: CLINICAL NURSE SPECIALIST

## 2025-03-06 RX ORDER — FLUOXETINE 20 MG/1
TABLET ORAL
Qty: 30 TABLET | Refills: 2 | Status: SHIPPED | OUTPATIENT
Start: 2025-03-06

## 2025-03-06 NOTE — ASSESSMENT & PLAN NOTE
Tolerated reduction in daily fluoxetine dose with good benefit for mood, but exacerbation of PMDD. Will attempt switch to fluoxetine tabs to allow for intermittent dose titration (insurance permitting) as noted above. Will otherwise continue medications without dose changes today.     Continue fluoxetine 10 mg PO daily, and increase to 20 mg daily x 2 weeks (beginning 1 week prior to PMDD sx onset) as noted above.   Continue quetiapine 50 mg PO at bedtime- no refill needed today.    Continue bupropion  mg PO BID- no refill needed today.    Continue individual therapy.

## 2025-03-06 NOTE — ASSESSMENT & PLAN NOTE
Sx have improved with reduction in daily fluoxetine dose as noted above.   Rarely taking alprazolam PRN- has not taken since last visit. Reasonable to continue very low dose as needed for severe sx, often associated with psychosocial stressors. No refill needed today.

## 2025-03-06 NOTE — ASSESSMENT & PLAN NOTE
"Improvement in mood and anxious sx overall since reduction in fluoxetine, but some exacerbation of PMDD sx. Would ideally like to have option for intermittent dose titration to target this. Insurance was unwilling to cover rx for 10 mg and 20 mg caps, so will attempt change to rx for 20 mg tabs.     Change fluoxetine rx to 10 mg (0.5 tab) PO daily, and increase to 20 mg (1 tab) PO daily 1 week prior to usual onset of PMDD sx. Continue 20 mg dose x 2 weeks, then resume 10 mg daily.     As noted in previous visit, limited research/clinical guidelines for tx of PMDD in individuals with bipolar disorder. General approach is tx (intermittent or continuous) with low-dose SSRI or SNRI. She is currently prescribed bupropion, but evidence for its use in tx of PMDD is very limited. May be worth considering in the future. Reviewed low risk for \"manic switch\" in individuals with bipolar disorder receiving tx with an antidepressant. She will continue routine tx with mood stabilizing medication, which may mitigate this (low) risk further.     "

## 2025-03-06 NOTE — ASSESSMENT & PLAN NOTE
Tolerating and benefiting from current regimen. No indication for medication changes today.      Continue Adderall ER 20 mg PO daily- refills on file at [facility] pharmacy.    Reviewed OARRS, no discrepancies or concerns. Discussed risk of insomnia, anxiety, agitation, anorexia, autonomic effects, toxicity, psychosis, dependence, tolerance, abuse.     Last UDS 2/26/2024- consistent with current rx, no discrepancies. Will order repeat/annual UDS at follow up.

## 2025-03-06 NOTE — PROGRESS NOTES
"Outpatient Psychiatry      Subjective   Tete Bermanolaro \"Sivan\", is a 44 y.o. female,  seen in follow-up.      Assessment/Plan   Problem List Items Addressed This Visit             ICD-10-CM    Bipolar 1 disorder, mixed, moderate (Multi) F31.62     Tolerated reduction in daily fluoxetine dose with good benefit for mood, but exacerbation of PMDD. Will attempt switch to fluoxetine tabs to allow for intermittent dose titration (insurance permitting) as noted above. Will otherwise continue medications without dose changes today.     Continue fluoxetine 10 mg PO daily, and increase to 20 mg daily x 2 weeks (beginning 1 week prior to PMDD sx onset) as noted above.   Continue quetiapine 50 mg PO at bedtime- no refill needed today.    Continue bupropion  mg PO BID- no refill needed today.    Continue individual therapy.          Relevant Medications    FLUoxetine (PROzac) 20 mg tablet    Other Relevant Orders    Follow Up In Psychiatry    Adjustment disorder with anxious mood F43.22     Sx have improved with reduction in daily fluoxetine dose as noted above.   Rarely taking alprazolam PRN- has not taken since last visit. Reasonable to continue very low dose as needed for severe sx, often associated with psychosocial stressors. No refill needed today.          Relevant Orders    Follow Up In Psychiatry    ADHD (attention deficit hyperactivity disorder), inattentive type F90.0     Tolerating and benefiting from current regimen. No indication for medication changes today.      Continue Adderall ER 20 mg PO daily- refills on file at [facility] pharmacy.    Reviewed OARRS, no discrepancies or concerns. Discussed risk of insomnia, anxiety, agitation, anorexia, autonomic effects, toxicity, psychosis, dependence, tolerance, abuse.     Last UDS 2/26/2024- consistent with current rx, no discrepancies. Will order repeat/annual UDS at follow up.          Relevant Orders    Follow Up In Psychiatry    PMDD (premenstrual dysphoric " "disorder) F32.81     Improvement in mood and anxious sx overall since reduction in fluoxetine, but some exacerbation of PMDD sx. Would ideally like to have option for intermittent dose titration to target this. Insurance was unwilling to cover rx for 10 mg and 20 mg caps, so will attempt change to rx for 20 mg tabs.     Change fluoxetine rx to 10 mg (0.5 tab) PO daily, and increase to 20 mg (1 tab) PO daily 1 week prior to usual onset of PMDD sx. Continue 20 mg dose x 2 weeks, then resume 10 mg daily.     As noted in previous visit, limited research/clinical guidelines for tx of PMDD in individuals with bipolar disorder. General approach is tx (intermittent or continuous) with low-dose SSRI or SNRI. She is currently prescribed bupropion, but evidence for its use in tx of PMDD is very limited. May be worth considering in the future. Reviewed low risk for \"manic switch\" in individuals with bipolar disorder receiving tx with an antidepressant. She will continue routine tx with mood stabilizing medication, which may mitigate this (low) risk further.            Relevant Medications    FLUoxetine (PROzac) 20 mg tablet       Follow-up in 6-8 weeks.     Risk Assessment:  Risk Assessment: Tete Underwood is currently a low acute risk of suicide and self-harm due to no past suicide attempt(s) and not currently endorsing thoughts of suicide. Tete Underwood is currently a low acute risk of violence and harm to others due to no past history of violence and not currently threatening others.  Suicidal Risk Factors: , history of trauma/abuse, chronic medical illness, and current psychiatric illness  Violence Risk Factors: none  Protective Factors: strong coping skills, sense of responsibility towards family, social support/connectedness, moral objections to suicide, child related concerns/living with child <18 years, hopefulness/future orientation, and employment     Provided with crisis/emergency resources, including " "Mobile Crisis 330-642-1011, Crisis Text Line (text 4OIQT ub 064137) and the National Suicide Prevention Lifeline hotline 1-646.311.9539. Agrees to call 988 or go to the nearest emergency department if she feels unsafe, or has suicidal thinking with a plan or intent.       Reason for Visit:  Follow-up for medication management.     HPI:  Since her last visit,     Things have been good.   Tolerated reduction in fluoxetine dose.  Improvement in racing thoughts, ruminations  Sleep has been good.   Mood has been, \"pretty even keel.\"     Work is going well.   Just interviewed for a promotion, and should hear back about decision next week     Exploring options for possibly selling her house and purchasing a home with her bf.  Ideally would like to have more room for family to live together.     Overall things have been in a good place.   Still struggling with PMDD sx, wishes it could be better managed.  Isn't sure that reduction in fluoxetine dose has resulted in drastically worse sx, but noticeable.     Denies suicidal ideation or a passive death wish.     No new medications or health problems.       Current Psychiatric Medications:  Alprazolam 0.25 mg PO PRN for severe anxiety/panic- managed by PCP (rarely taking)  Bupropion  mg PO BID  Fluoxetine 10 mg PO daily   Quetiapine 50 mg PO at bedtime  Adderall XR 20 mg PO daily      Record Review: brief     Medical Review Of Systems:  Pertinent items are noted in HPI.    Psychiatric Review Of Systems:  As noted in HPI.        Objective   Mental Status Exam  General Appearance: Well groomed, appropriate eye contact  Attitude/Behavior: Cooperative  Motor: No psychomotor agitation or retardation, no tremor or other abnormal movements  Speech: Normal rate, volume, prosody  Mood: \"Pretty even keel.\"  Affect: Euthymic, full-range, Congruent with mood and topic of conversation  Thought Process: Linear, goal directed  Thought Associations: No loosening of associations  Thought " Content: Normal  Perception: No perceptual abnormalities noted  Sensorium: Alert and oriented to person, place, time and situation  Insight: Intact  Judgement: Intact  Cognition: Cognitively intact to conversational testing with respect to attention, orientation, fund of knowledge, recent and remote memory, and language    Vitals:  There were no vitals filed for this visit.    Labs:  not applicable        Rachel Smlal, APRN-CNP, APRN-CNS

## 2025-03-10 ENCOUNTER — PHARMACY VISIT (OUTPATIENT)
Dept: PHARMACY | Facility: CLINIC | Age: 45
End: 2025-03-10
Payer: COMMERCIAL

## 2025-03-10 PROCEDURE — RXMED WILLOW AMBULATORY MEDICATION CHARGE

## 2025-04-01 ENCOUNTER — PHARMACY VISIT (OUTPATIENT)
Dept: PHARMACY | Facility: CLINIC | Age: 45
End: 2025-04-01
Payer: COMMERCIAL

## 2025-04-01 ENCOUNTER — SPECIALTY PHARMACY (OUTPATIENT)
Dept: PHARMACY | Facility: CLINIC | Age: 45
End: 2025-04-01

## 2025-04-01 PROCEDURE — RXMED WILLOW AMBULATORY MEDICATION CHARGE

## 2025-04-08 ENCOUNTER — APPOINTMENT (OUTPATIENT)
Dept: PRIMARY CARE | Facility: CLINIC | Age: 45
End: 2025-04-08
Payer: COMMERCIAL

## 2025-04-08 ENCOUNTER — APPOINTMENT (OUTPATIENT)
Dept: OBSTETRICS AND GYNECOLOGY | Facility: CLINIC | Age: 45
End: 2025-04-08
Payer: COMMERCIAL

## 2025-04-08 VITALS — DIASTOLIC BLOOD PRESSURE: 83 MMHG | BODY MASS INDEX: 27.44 KG/M2 | WEIGHT: 150 LBS | SYSTOLIC BLOOD PRESSURE: 118 MMHG

## 2025-04-08 VITALS
HEART RATE: 78 BPM | BODY MASS INDEX: 27.42 KG/M2 | DIASTOLIC BLOOD PRESSURE: 60 MMHG | HEIGHT: 62 IN | SYSTOLIC BLOOD PRESSURE: 100 MMHG | WEIGHT: 149 LBS

## 2025-04-08 DIAGNOSIS — N76.0 ACUTE VAGINITIS: ICD-10-CM

## 2025-04-08 DIAGNOSIS — E88.819 INSULIN RESISTANCE: Primary | ICD-10-CM

## 2025-04-08 DIAGNOSIS — B37.9 YEAST INFECTION: ICD-10-CM

## 2025-04-08 DIAGNOSIS — E66.9 OBESITY WITH SERIOUS COMORBIDITY, UNSPECIFIED CLASS, UNSPECIFIED OBESITY TYPE: Primary | ICD-10-CM

## 2025-04-08 PROCEDURE — 99213 OFFICE O/P EST LOW 20 MIN: CPT | Performed by: FAMILY MEDICINE

## 2025-04-08 PROCEDURE — 1036F TOBACCO NON-USER: CPT

## 2025-04-08 PROCEDURE — 3074F SYST BP LT 130 MM HG: CPT

## 2025-04-08 PROCEDURE — 99213 OFFICE O/P EST LOW 20 MIN: CPT

## 2025-04-08 PROCEDURE — 3074F SYST BP LT 130 MM HG: CPT | Performed by: FAMILY MEDICINE

## 2025-04-08 PROCEDURE — 3079F DIAST BP 80-89 MM HG: CPT

## 2025-04-08 PROCEDURE — 3078F DIAST BP <80 MM HG: CPT | Performed by: FAMILY MEDICINE

## 2025-04-08 PROCEDURE — 3008F BODY MASS INDEX DOCD: CPT | Performed by: FAMILY MEDICINE

## 2025-04-08 PROCEDURE — 1036F TOBACCO NON-USER: CPT | Performed by: FAMILY MEDICINE

## 2025-04-08 RX ORDER — FLUCONAZOLE 150 MG/1
150 TABLET ORAL ONCE
Qty: 1 TABLET | Refills: 0 | Status: SHIPPED | OUTPATIENT
Start: 2025-04-08 | End: 2025-04-08

## 2025-04-08 ASSESSMENT — PATIENT HEALTH QUESTIONNAIRE - PHQ9
1. LITTLE INTEREST OR PLEASURE IN DOING THINGS: NOT AT ALL
2. FEELING DOWN, DEPRESSED OR HOPELESS: NOT AT ALL
SUM OF ALL RESPONSES TO PHQ9 QUESTIONS 1 AND 2: 0

## 2025-04-08 ASSESSMENT — ENCOUNTER SYMPTOMS
HEADACHES: 0
SHORTNESS OF BREATH: 0
ACTIVITY CHANGE: 0
DIZZINESS: 0
FATIGUE: 0
FEVER: 0

## 2025-04-08 NOTE — PROGRESS NOTES
"Subjective   Patient ID: Sivan Underwood is a 45 y.o. female who presents for Follow-up (Weight loss).    Obesity   - reports she has been consistent on her semaglutide injections for the last few months   - has noticed over the last two months the food noise has been gradually coming back   - has still been making progress with her weight loss   - reports she has a goal to lose approximately 10 more pounds   - has been snacking a little more often, and her portions have not been as well managed          Review of Systems   Constitutional:  Negative for activity change, fatigue and fever.   Respiratory:  Negative for shortness of breath.    Cardiovascular:  Negative for chest pain.   Neurological:  Negative for dizziness and headaches.       Objective   /60   Pulse 78   Ht 1.575 m (5' 2\")   Wt 67.6 kg (149 lb)   BMI 27.25 kg/m²     Physical Exam  Constitutional:       Appearance: Normal appearance.   Cardiovascular:      Rate and Rhythm: Normal rate and regular rhythm.   Neurological:      Mental Status: She is alert.   Psychiatric:         Mood and Affect: Mood normal.         Behavior: Behavior normal.         Assessment/Plan   Problem List Items Addressed This Visit    None  Visit Diagnoses         Codes    Obesity with serious comorbidity, unspecified class, unspecified obesity type    -  Primary E66.9    stable   - tx with semaglutide   - f/u 1 month                "

## 2025-04-08 NOTE — PROGRESS NOTES
"Subjective   Patient ID: Tete Dang \"Socrtaes" is a 45 y.o. female who presents for Vaginitis/Bacterial Vaginosis (Yeast infection every month for the last 3-4 months.  ).    45 year old female presenting with complaints of recurrent vaginal irritation for the last 3-4 months with symptoms that are responding to OTC Monistat ( on day 2 of 3 day treatment). She has history of PCOS, had issue with yeast infections approximately 10 years that required extended regimen. She denies any changes to environmental or medications. Denies association with menses.     Denies vaginal bleeding/discharge, symptoms feel more related to inflammation and discomfort. Has some mild itching. Denies urinary symptoms. No concern for STI at this time.       Review of Systems   All other systems reviewed and are negative.      Objective   Physical Exam  Vitals and nursing note reviewed.         Assessment/Plan   Problem List Items Addressed This Visit    None  Visit Diagnoses         Codes    Insulin resistance    -  Primary E88.819    Yeast infection     B37.9    Relevant Medications    fluconazole (Diflucan) 150 mg tablet    Other Relevant Orders    Hemoglobin A1C          Discussed with patient that obtaining genital culture today will not likely be helpful in obtaining good sampling as she been using monistat OTC for the last two days, and been using repeatedly for treatment over the last few weeks. Will RTC for genital culture in the next 2 weeks. Will send HA1C to check for diabetes.        STEFANIA Jenkins-CNP 04/08/25 4:37 PM   "

## 2025-04-09 LAB
EST. AVERAGE GLUCOSE BLD GHB EST-MCNC: 94 MG/DL
EST. AVERAGE GLUCOSE BLD GHB EST-SCNC: 5.2 MMOL/L
HBA1C MFR BLD: 4.9 % OF TOTAL HGB